# Patient Record
Sex: MALE | Race: BLACK OR AFRICAN AMERICAN | NOT HISPANIC OR LATINO | URBAN - METROPOLITAN AREA
[De-identification: names, ages, dates, MRNs, and addresses within clinical notes are randomized per-mention and may not be internally consistent; named-entity substitution may affect disease eponyms.]

---

## 2018-03-22 ENCOUNTER — EMERGENCY (EMERGENCY)
Facility: HOSPITAL | Age: 78
LOS: 0 days | Discharge: HOME | End: 2018-03-22
Attending: EMERGENCY MEDICINE
Payer: COMMERCIAL

## 2018-03-22 VITALS
OXYGEN SATURATION: 98 % | SYSTOLIC BLOOD PRESSURE: 133 MMHG | RESPIRATION RATE: 17 BRPM | TEMPERATURE: 98 F | DIASTOLIC BLOOD PRESSURE: 97 MMHG

## 2018-03-22 VITALS
RESPIRATION RATE: 18 BRPM | OXYGEN SATURATION: 98 % | HEART RATE: 80 BPM | WEIGHT: 251.99 LBS | TEMPERATURE: 98 F | DIASTOLIC BLOOD PRESSURE: 87 MMHG | SYSTOLIC BLOOD PRESSURE: 130 MMHG | HEIGHT: 76 IN

## 2018-03-22 DIAGNOSIS — E11.649 TYPE 2 DIABETES MELLITUS WITH HYPOGLYCEMIA WITHOUT COMA: ICD-10-CM

## 2018-03-22 DIAGNOSIS — I10 ESSENTIAL (PRIMARY) HYPERTENSION: ICD-10-CM

## 2018-03-22 DIAGNOSIS — R73.9 HYPERGLYCEMIA, UNSPECIFIED: ICD-10-CM

## 2018-03-22 LAB
GLUCOSE BLDC GLUCOMTR-MCNC: 227 MG/DL — HIGH (ref 70–99)
GLUCOSE BLDC GLUCOMTR-MCNC: 305 MG/DL — HIGH (ref 70–99)

## 2018-03-22 PROCEDURE — 99282 EMERGENCY DEPT VISIT SF MDM: CPT

## 2018-03-22 NOTE — ED ADULT NURSE NOTE - PMH
Afib    Arthritis    Arthritis    Atrial fibrillation    Ex-Cigarette Smoker    HTN (hypertension)    Hypertension    Osteoarthrosis, localized, primary, involving lower leg

## 2018-03-22 NOTE — ED PROVIDER NOTE - MEDICAL DECISION MAKING DETAILS
78 yo M with hypoglycemia 2/2 sulfonylurea  -sugar 200, will check again  -pt. understands that sulfonylureas can lower glucose to dangerous levels.  he agrees to stop taking this medicine until further instructed by PCP, pt. agrees to check his sugar tonight every few hours and eat well to avoid hypoglycemia, he understands that seizures can precipitate or even death if sugar goes too low.  all of this was discussed with patient and he verbalizes understanding at bedside

## 2018-03-22 NOTE — ED ADULT NURSE NOTE - PSH
H/O: knee surgery  2011  History of right  Inguinal Hernia Repair    Hx of tonsillectomy  as a child  Right Inguinal Hernia Repair ~ 2001

## 2018-03-22 NOTE — ED PROVIDER NOTE - OBJECTIVE STATEMENT
78 yo M with hypoglycemia at home.  Pt. measured his sugar at 68 a home and was told to come to the ED for eval.  Pt. says he feels fine, he felt fine before when he checked his sugar.  He notes that he takes glimepiride, last dose was 2 mg this morning.  Pt. has no complaints.  Finger stick sugar at triage was 200.    ROS: negative for fever, cough, headache, chest pain, shortness of breath, abd pain, nausea, vomiting, diarrhea, rash, paresthesia, and weakness.   PMH: NIDDM, HTN; Meds: losartan 100, carvedilol, Eliquis, folic acid, amlodipine, glimepiride, Lenny 40, MV; SH: Denies smoking/drinking/drug use

## 2019-05-07 ENCOUNTER — OUTPATIENT (OUTPATIENT)
Dept: OUTPATIENT SERVICES | Facility: HOSPITAL | Age: 79
LOS: 1 days | End: 2019-05-07
Payer: COMMERCIAL

## 2019-05-07 ENCOUNTER — APPOINTMENT (OUTPATIENT)
Dept: CV DIAGNOSITCS | Facility: HOSPITAL | Age: 79
End: 2019-05-07

## 2019-05-07 VITALS
SYSTOLIC BLOOD PRESSURE: 123 MMHG | DIASTOLIC BLOOD PRESSURE: 84 MMHG | WEIGHT: 251.99 LBS | OXYGEN SATURATION: 98 % | HEIGHT: 76 IN | RESPIRATION RATE: 16 BRPM | HEART RATE: 67 BPM

## 2019-05-07 DIAGNOSIS — Z01.818 ENCOUNTER FOR OTHER PREPROCEDURAL EXAMINATION: ICD-10-CM

## 2019-05-07 DIAGNOSIS — I48.91 UNSPECIFIED ATRIAL FIBRILLATION: ICD-10-CM

## 2019-05-07 LAB
ALBUMIN SERPL ELPH-MCNC: 4.2 G/DL — SIGNIFICANT CHANGE UP (ref 3.3–5)
ALP SERPL-CCNC: 81 U/L — SIGNIFICANT CHANGE UP (ref 40–120)
ALT FLD-CCNC: 13 U/L — SIGNIFICANT CHANGE UP (ref 10–45)
ANION GAP SERPL CALC-SCNC: 13 MMOL/L — SIGNIFICANT CHANGE UP (ref 5–17)
APTT BLD: 33 SEC — SIGNIFICANT CHANGE UP (ref 27.5–36.3)
AST SERPL-CCNC: 11 U/L — SIGNIFICANT CHANGE UP (ref 10–40)
BILIRUB SERPL-MCNC: 1 MG/DL — SIGNIFICANT CHANGE UP (ref 0.2–1.2)
BLD GP AB SCN SERPL QL: NEGATIVE — SIGNIFICANT CHANGE UP
BUN SERPL-MCNC: 18 MG/DL — SIGNIFICANT CHANGE UP (ref 7–23)
CALCIUM SERPL-MCNC: 10.1 MG/DL — SIGNIFICANT CHANGE UP (ref 8.4–10.5)
CHLORIDE SERPL-SCNC: 99 MMOL/L — SIGNIFICANT CHANGE UP (ref 96–108)
CO2 SERPL-SCNC: 27 MMOL/L — SIGNIFICANT CHANGE UP (ref 22–31)
CREAT SERPL-MCNC: 1.34 MG/DL — HIGH (ref 0.5–1.3)
GLUCOSE SERPL-MCNC: 117 MG/DL — HIGH (ref 70–99)
HCT VFR BLD CALC: 44.8 % — SIGNIFICANT CHANGE UP (ref 39–50)
HGB BLD-MCNC: 14.8 G/DL — SIGNIFICANT CHANGE UP (ref 13–17)
INR BLD: 1.29 RATIO — HIGH (ref 0.88–1.16)
MCHC RBC-ENTMCNC: 32.3 PG — SIGNIFICANT CHANGE UP (ref 27–34)
MCHC RBC-ENTMCNC: 33.1 GM/DL — SIGNIFICANT CHANGE UP (ref 32–36)
MCV RBC AUTO: 97.7 FL — SIGNIFICANT CHANGE UP (ref 80–100)
PLATELET # BLD AUTO: 204 K/UL — SIGNIFICANT CHANGE UP (ref 150–400)
POTASSIUM SERPL-MCNC: 3.5 MMOL/L — SIGNIFICANT CHANGE UP (ref 3.5–5.3)
POTASSIUM SERPL-SCNC: 3.5 MMOL/L — SIGNIFICANT CHANGE UP (ref 3.5–5.3)
PROT SERPL-MCNC: 7.7 G/DL — SIGNIFICANT CHANGE UP (ref 6–8.3)
PROTHROM AB SERPL-ACNC: 14.8 SEC — HIGH (ref 10–12.9)
RBC # BLD: 4.59 M/UL — SIGNIFICANT CHANGE UP (ref 4.2–5.8)
RBC # FLD: 11.9 % — SIGNIFICANT CHANGE UP (ref 10.3–14.5)
RH IG SCN BLD-IMP: POSITIVE — SIGNIFICANT CHANGE UP
SODIUM SERPL-SCNC: 139 MMOL/L — SIGNIFICANT CHANGE UP (ref 135–145)
WBC # BLD: 7.1 K/UL — SIGNIFICANT CHANGE UP (ref 3.8–10.5)
WBC # FLD AUTO: 7.1 K/UL — SIGNIFICANT CHANGE UP (ref 3.8–10.5)

## 2019-05-07 PROCEDURE — 86901 BLOOD TYPING SEROLOGIC RH(D): CPT

## 2019-05-07 PROCEDURE — G0463: CPT

## 2019-05-07 PROCEDURE — 93312 ECHO TRANSESOPHAGEAL: CPT

## 2019-05-07 PROCEDURE — 93312 ECHO TRANSESOPHAGEAL: CPT | Mod: 26

## 2019-05-07 PROCEDURE — 85610 PROTHROMBIN TIME: CPT

## 2019-05-07 PROCEDURE — 93306 TTE W/DOPPLER COMPLETE: CPT

## 2019-05-07 PROCEDURE — 86850 RBC ANTIBODY SCREEN: CPT

## 2019-05-07 PROCEDURE — 93010 ELECTROCARDIOGRAM REPORT: CPT

## 2019-05-07 PROCEDURE — 93306 TTE W/DOPPLER COMPLETE: CPT | Mod: 26

## 2019-05-07 PROCEDURE — 80053 COMPREHEN METABOLIC PANEL: CPT

## 2019-05-07 PROCEDURE — 93005 ELECTROCARDIOGRAM TRACING: CPT

## 2019-05-07 PROCEDURE — 85730 THROMBOPLASTIN TIME PARTIAL: CPT

## 2019-05-07 PROCEDURE — 86900 BLOOD TYPING SEROLOGIC ABO: CPT

## 2019-05-07 PROCEDURE — 85027 COMPLETE CBC AUTOMATED: CPT

## 2019-05-07 NOTE — H&P CARDIOLOGY - HISTORY OF PRESENT ILLNESS
78 y/o male with PMHx of OA, HTN, and Afib on Eliquis c/o "constant" fatigue r/t arrhythmia.  Patient is now for Afib ablation on 5/8/2019, he presents today for CANDIDA and preop evaluation.

## 2019-05-07 NOTE — H&P CARDIOLOGY - PMH
Afib    Ex-Cigarette Smoker    HTN (hypertension)    Osteoarthrosis, localized, primary, involving lower leg

## 2019-05-08 ENCOUNTER — OUTPATIENT (OUTPATIENT)
Dept: INPATIENT UNIT | Facility: HOSPITAL | Age: 79
LOS: 1 days | End: 2019-05-08
Payer: COMMERCIAL

## 2019-05-08 ENCOUNTER — TRANSCRIPTION ENCOUNTER (OUTPATIENT)
Age: 79
End: 2019-05-08

## 2019-05-08 VITALS
HEIGHT: 76 IN | TEMPERATURE: 98 F | SYSTOLIC BLOOD PRESSURE: 141 MMHG | WEIGHT: 244.05 LBS | RESPIRATION RATE: 27 BRPM | DIASTOLIC BLOOD PRESSURE: 95 MMHG | HEART RATE: 66 BPM | OXYGEN SATURATION: 94 %

## 2019-05-08 DIAGNOSIS — I48.91 UNSPECIFIED ATRIAL FIBRILLATION: ICD-10-CM

## 2019-05-08 LAB
ALBUMIN SERPL ELPH-MCNC: 3.6 G/DL — SIGNIFICANT CHANGE UP (ref 3.3–5)
ALP SERPL-CCNC: 74 U/L — SIGNIFICANT CHANGE UP (ref 40–120)
ALT FLD-CCNC: 12 U/L — SIGNIFICANT CHANGE UP (ref 10–45)
ANION GAP SERPL CALC-SCNC: 12 MMOL/L — SIGNIFICANT CHANGE UP (ref 5–17)
AST SERPL-CCNC: 23 U/L — SIGNIFICANT CHANGE UP (ref 10–40)
BILIRUB SERPL-MCNC: 0.7 MG/DL — SIGNIFICANT CHANGE UP (ref 0.2–1.2)
BLD GP AB SCN SERPL QL: NEGATIVE — SIGNIFICANT CHANGE UP
BUN SERPL-MCNC: 16 MG/DL — SIGNIFICANT CHANGE UP (ref 7–23)
CALCIUM SERPL-MCNC: 8.9 MG/DL — SIGNIFICANT CHANGE UP (ref 8.4–10.5)
CHLORIDE SERPL-SCNC: 101 MMOL/L — SIGNIFICANT CHANGE UP (ref 96–108)
CO2 SERPL-SCNC: 24 MMOL/L — SIGNIFICANT CHANGE UP (ref 22–31)
CREAT SERPL-MCNC: 1.24 MG/DL — SIGNIFICANT CHANGE UP (ref 0.5–1.3)
GLUCOSE SERPL-MCNC: 157 MG/DL — HIGH (ref 70–99)
POTASSIUM SERPL-MCNC: 3.2 MMOL/L — LOW (ref 3.5–5.3)
POTASSIUM SERPL-SCNC: 3.2 MMOL/L — LOW (ref 3.5–5.3)
PROT SERPL-MCNC: 6.9 G/DL — SIGNIFICANT CHANGE UP (ref 6–8.3)
RH IG SCN BLD-IMP: POSITIVE — SIGNIFICANT CHANGE UP
SODIUM SERPL-SCNC: 137 MMOL/L — SIGNIFICANT CHANGE UP (ref 135–145)

## 2019-05-08 RX ORDER — POTASSIUM CHLORIDE 20 MEQ
40 PACKET (EA) ORAL EVERY 4 HOURS
Refills: 0 | Status: COMPLETED | OUTPATIENT
Start: 2019-05-08 | End: 2019-05-09

## 2019-05-08 RX ORDER — BENZOCAINE AND MENTHOL 5; 1 G/100ML; G/100ML
1 LIQUID ORAL EVERY 6 HOURS
Refills: 0 | Status: DISCONTINUED | OUTPATIENT
Start: 2019-05-08 | End: 2019-05-23

## 2019-05-08 RX ORDER — CARVEDILOL PHOSPHATE 80 MG/1
3.12 CAPSULE, EXTENDED RELEASE ORAL EVERY 12 HOURS
Refills: 0 | Status: DISCONTINUED | OUTPATIENT
Start: 2019-05-08 | End: 2019-05-23

## 2019-05-08 RX ORDER — RIVAROXABAN 15 MG-20MG
20 KIT ORAL EVERY 24 HOURS
Refills: 0 | Status: DISCONTINUED | OUTPATIENT
Start: 2019-05-08 | End: 2019-05-08

## 2019-05-08 RX ORDER — CHLORHEXIDINE GLUCONATE 213 G/1000ML
1 SOLUTION TOPICAL DAILY
Refills: 0 | Status: DISCONTINUED | OUTPATIENT
Start: 2019-05-08 | End: 2019-05-23

## 2019-05-08 RX ORDER — PANTOPRAZOLE SODIUM 20 MG/1
40 TABLET, DELAYED RELEASE ORAL
Refills: 0 | Status: DISCONTINUED | OUTPATIENT
Start: 2019-05-08 | End: 2019-05-23

## 2019-05-08 RX ORDER — APIXABAN 2.5 MG/1
2.5 TABLET, FILM COATED ORAL EVERY 12 HOURS
Refills: 0 | Status: DISCONTINUED | OUTPATIENT
Start: 2019-05-08 | End: 2019-05-08

## 2019-05-08 RX ORDER — LOSARTAN POTASSIUM 100 MG/1
100 TABLET, FILM COATED ORAL DAILY
Refills: 0 | Status: DISCONTINUED | OUTPATIENT
Start: 2019-05-08 | End: 2019-05-23

## 2019-05-08 RX ORDER — CHLORHEXIDINE GLUCONATE 213 G/1000ML
1 SOLUTION TOPICAL
Refills: 0 | Status: DISCONTINUED | OUTPATIENT
Start: 2019-05-08 | End: 2019-05-23

## 2019-05-08 RX ORDER — AMLODIPINE BESYLATE 2.5 MG/1
5 TABLET ORAL DAILY
Refills: 0 | Status: DISCONTINUED | OUTPATIENT
Start: 2019-05-08 | End: 2019-05-23

## 2019-05-08 RX ORDER — APIXABAN 2.5 MG/1
5 TABLET, FILM COATED ORAL EVERY 12 HOURS
Refills: 0 | Status: DISCONTINUED | OUTPATIENT
Start: 2019-05-08 | End: 2019-05-23

## 2019-05-08 RX ORDER — PANTOPRAZOLE SODIUM 20 MG/1
40 TABLET, DELAYED RELEASE ORAL
Refills: 0 | Status: DISCONTINUED | OUTPATIENT
Start: 2019-05-08 | End: 2019-05-08

## 2019-05-08 RX ADMIN — BENZOCAINE AND MENTHOL 1 LOZENGE: 5; 1 LIQUID ORAL at 17:32

## 2019-05-08 RX ADMIN — CARVEDILOL PHOSPHATE 3.12 MILLIGRAM(S): 80 CAPSULE, EXTENDED RELEASE ORAL at 17:32

## 2019-05-08 RX ADMIN — APIXABAN 5 MILLIGRAM(S): 2.5 TABLET, FILM COATED ORAL at 17:33

## 2019-05-08 RX ADMIN — Medication 40 MILLIEQUIVALENT(S): at 22:55

## 2019-05-08 NOTE — CONSULT NOTE ADULT - SUBJECTIVE AND OBJECTIVE BOX
Requesting Physician : Dr. Barclay    Reason for Consultation: Dyspnea, HTN    HISTORY OF PRESENT ILLNESS:  78 yo M with history of HTN, Afib, gout who was admitted post Afib ablation.  The patient has been experiencing VÁSQUEZ and sob over the past year.  He was evaluated by Dr. Barclay for symptomatic Afib and was referred for Afib ablation.  The patient underwent afib ablation with no complications.  Upon evaluation, the patient denied chest pain, sob, palpitations, or any other cardiac complaints.         PAST MEDICAL & SURGICAL HISTORY:  Osteoarthrosis, localized, primary, involving lower leg  HTN (hypertension)  Afib  Ex-Cigarette Smoker  H/O: knee surgery: 2011  Hx of tonsillectomy: as a child  History of right  Inguinal Hernia Repair  Right Inguinal Hernia Repair ~ 2001          MEDICATIONS:  MEDICATIONS  (STANDING):  amLODIPine   Tablet 5 milliGRAM(s) Oral daily  apixaban 5 milliGRAM(s) Oral every 12 hours  carvedilol 3.125 milliGRAM(s) Oral every 12 hours  chlorhexidine 2% Cloths 1 Application(s) Topical daily  chlorhexidine 4% Liquid 1 Application(s) Topical <User Schedule>  losartan 100 milliGRAM(s) Oral daily  pantoprazole    Tablet 40 milliGRAM(s) Oral before breakfast      Allergies    No Known Allergies    Intolerances        FAMILY HISTORY:  FH: HTN (hypertension)    Non-contributary for premature coronary disease or sudden cardiac death    SOCIAL HISTORY:    [x ] Non-smoker  [ ] Smoker  [ ] Alcohol      REVIEW OF SYSTEMS:  [ ]chest pain  [ x ]shortness of breath  [  ]palpitations  [  ]syncope  [ ]near syncope [ ]upper extremity weakness   [ ] lower extremity weakness  [  ]diplopia  [  ]altered mental status   [  ]fevers  [ ]chills [ ]nausea  [ ]vomitting  [  ]dysphagia    [ ]abdominal pain  [ ]melena  [ ]BRBPR    [  ]epistaxis  [  ]rash    [ ]lower extremity edema        [x ] All others negative	  [ ] Unable to obtain    PHYSICAL EXAM:  T(C): 37.1 (05-08-19 @ 16:30), Max: 37.1 (05-08-19 @ 16:30)  HR: 74 (05-08-19 @ 18:07) (64 - 74)  BP: 117/86 (05-08-19 @ 18:07) (117/86 - 165/97)  RR: 20 (05-08-19 @ 18:07) (18 - 23)  SpO2: 97% (05-08-19 @ 18:07) (94% - 97%)  Wt(kg): --  I&O's Summary    08 May 2019 07:01  -  08 May 2019 19:27  --------------------------------------------------------  IN: 120 mL / OUT: 975 mL / NET: -855 mL          HEENT:   Normal oral mucosa, PERRL, EOMI	  Lymphatic: No lymphadenopathy , no edema  Cardiovascular: Normal S1 S2, No JVD, No murmurs , Peripheral pulses palpable 2+ bilaterally  Respiratory: Lungs clear to auscultation, normal effort 	  Gastrointestinal:  Soft, Non-tender, + BS	  Skin: No rashes, No ecchymoses, No cyanosis, warm to touch  Musculoskeletal: Normal range of motion, normal strength  Psychiatry:  Mood & affect appropriate      TELEMETRY: SR	    ECG:  < from: 12 Lead ECG (05.08.19 @ 07:02) >  Diagnosis Line ATRIAL FIBRILLATION    < end of copied text >  	  RADIOLOGY:  OTHER:     DIAGNOSTIC TESTING:  [ ] Echocardiogram: < from: Transesophageal Echocardiogram (05.07.19 @ 14:46) >  Conclusions:  1. Mildly dilated left atrium.  LA volume index = 36 cc/m2.    Spontaneous echo contrast seen.   No left atrial or left  atrial appendage thrombus.   Normal left atrial appendage  function.  2. Right atrial enlargement.  3. Right ventricular enlargement with normal right  ventricular systolic function.  4. Agitated saline injection and color flow Doppler  demonstrates no evidence of a patent foramen ovale.    < end of copied text >    [ ]  Catheterization:  [ ] Stress Test:    	  	  LABS:	 	    CARDIAC MARKERS:                              14.8   7.1   )-----------( 204      ( 07 May 2019 13:17 )             44.8     05-07    139  |  99  |  18  ----------------------------<  117<H>  3.5   |  27  |  1.34<H>    Ca    10.1      07 May 2019 13:17    TPro  7.7  /  Alb  4.2  /  TBili  1.0  /  DBili  x   /  AST  11  /  ALT  13  /  AlkPhos  81  05-07    proBNP:   Lipid Profile:   HgA1c:   TSH:     ASSESSMENT/PLAN:    78 yo M with history of HTN, Afib, gout who was admitted post Afib ablation.    -pt. tolerated procedure well   -continue with ccu monitoring  -no clinical heart failure  -continue with ac for cva prevention  -follow up ep    Sugey Mcconnell MD

## 2019-05-08 NOTE — H&P ADULT - NSICDXPASTSURGICALHX_GEN_ALL_CORE_FT
PAST SURGICAL HISTORY:  H/O: knee surgery 2011    History of right  Inguinal Hernia Repair     Hx of tonsillectomy as a child    Right Inguinal Hernia Repair ~ 2001

## 2019-05-08 NOTE — H&P ADULT - NSHPPHYSICALEXAM_GEN_ALL_CORE
Vital Signs Last 24 Hrs  T(C): --  T(F): --  HR: 66 (08 May 2019 14:15) (66 - 66)  BP: 126/86 (08 May 2019 14:15) (126/86 - 141/95)  BP(mean): 100 (08 May 2019 14:15) (100 - 109)  RR: 23 (08 May 2019 14:15) (23 - 27)  SpO2: 96% (08 May 2019 14:15) (94% - 96%)  CAPILLARY BLOOD GLUCOSE        I&O's Summary    08 May 2019 07:01  -  08 May 2019 14:50  --------------------------------------------------------  IN: 0 mL / OUT: 200 mL / NET: -200 mL        PHYSICAL EXAM:  GENERAL: NAD, well-developed  HEAD:  Atraumatic, Normocephalic  EYES: Arcus, senalis, EOMI, PERRLA  NECK: Supple, No JVD  CHEST/LUNG: Clear to auscultation bilaterally; No wheeze  HEART: Regular rate and rhythm; No murmurs, rubs, or gallops  ABDOMEN: Soft, Nontender, Nondistended; Bowel sounds present  EXTREMITIES:  sutures in places bilateral groin, 2+ Peripheral Pulses, No clubbing, cyanosis, or edema  PSYCH: AAOx3  NEUROLOGY: non-focal  SKIN: No rashes or lesions

## 2019-05-08 NOTE — H&P ADULT - HISTORY OF PRESENT ILLNESS
80 yo M pmhx of HTN, afib, ?gout presenting for afib ablation. Patient states he has had worsening VÁSQUEZ over the past year now with 2 flight VÁSQUEZ and SOB after 4-5 blocks. He states that he has also felt weak and tired without palpitations, chest pain, N/V, diaphoresis, syncope. Patient admitted to CCU for monitoring after afib ablation. Patient states that he feels well has a sore throat. Otherwise denies fever, chills, chest pain, palpitations, SOB, abdominal pain, N/V, diarrhea, dysuria.

## 2019-05-08 NOTE — H&P ADULT - NSICDXPASTMEDICALHX_GEN_ALL_CORE_FT
PAST MEDICAL HISTORY:  Afib     Ex-Cigarette Smoker     HTN (hypertension)     Osteoarthrosis, localized, primary, involving lower leg

## 2019-05-08 NOTE — H&P ADULT - ASSESSMENT
80 yo M pmhx of HTN, afib, ?gout presenting for monitoring s/p afib ablation    Afib ablation  -monitor on tele  -remove sutures from groin at 6hrs  -continue home meds carvedilol  -eliquis to 5 bid  -protonix 40mg daily for 1 month  -F/U with Rachelle on Tuesday May 21 at 11:45am  -f/u wtih me on Thursday may 23 at 3pm  HTN:  -Continue home amlodipine losartan

## 2019-05-08 NOTE — H&P ADULT - NSHPREVIEWOFSYSTEMS_GEN_ALL_CORE
REVIEW OF SYSTEMS:  Constitutional: [ X] negative [ ] fevers [ ] chills [ ] weight loss [ ] weight gain  HEENT: [X ] negative [ ] dry eyes [ ] eye irritation [ ] postnasal drip [ ] nasal congestion  CV: [X] negative  [ ] chest pain [ ] orthopnea [ ] palpitations [ ] murmur  Resp: [ X] negative [ ] cough [ ] shortness of breath [ ] dyspnea [ ] wheezing [ ] sputum [ ] hemoptysis  GI: [X ] negative [ ] nausea [ ] vomiting [ ] diarrhea [ ] constipation [ ] abd pain [ ] dysphagia   : [X ] negative [ ] dysuria [ ] nocturia [ ] hematuria [ ] increased urinary frequency  Musculoskeletal: [X ] negative [ ] back pain [ ] myalgias [ ] arthralgias [ ] fracture  Skin: [ X] negative [ ] rash [ ] itch  Neurological: [X ] negative [ ] headache [ ] dizziness [ ] syncope [ ] weakness [ ] numbness  Psychiatric: [ X] negative [ ] anxiety [ ] depression  Endocrine: [ X] negative [ ] diabetes [ ] thyroid problem  Hematologic/Lymphatic: [X ] negative [ ] anemia [ ] bleeding problem  Allergic/Immunologic: [X ] negative [ ] itchy eyes [ ] nasal discharge [ ] hives [ ] angioedema  [ ] All other systems negative  [ ] Unable to assess ROS because ________

## 2019-05-08 NOTE — H&P ADULT - NSHPLABSRESULTS_GEN_ALL_CORE
LABS:                        14.8   7.1   )-----------( 204      ( 07 May 2019 13:17 )             44.8     05-07    139  |  99  |  18  ----------------------------<  117<H>  3.5   |  27  |  1.34<H>    Ca    10.1      07 May 2019 13:17    TPro  7.7  /  Alb  4.2  /  TBili  1.0  /  DBili  x   /  AST  11  /  ALT  13  /  AlkPhos  81  05-07    PT/INR - ( 07 May 2019 13:17 )   PT: 14.8 sec;   INR: 1.29 ratio         PTT - ( 07 May 2019 13:17 )  PTT:33.0 sec    < from: Transesophageal Echocardiogram (05.07.19 @ 14:46) >    1. Mildly dilated left atrium.  LA volume index = 36 cc/m2.    Spontaneous echo contrast seen.   No left atrial or left  atrial appendage thrombus.   Normal left atrial appendage  function.  2. Right atrial enlargement.  3. Right ventricular enlargement with normal right  ventricular systolic function.  4. Agitated saline injection and color flow Doppler  demonstrates no evidence of a patent foramen ovale.    < end of copied text >

## 2019-05-08 NOTE — PROGRESS NOTE ADULT - SUBJECTIVE AND OBJECTIVE BOX
EP Brief Operative Note    Diagnosis: persistent AF  Procedure: persistent AF ablation and AFL ablation  Surgeon: Theodore Barclay M.D.  Findings: none  EBL: minimal  Specimens: none  Post-op Diagnosis: NSR  Assistants: none      A/P) s/p atrial fibrillation (PVI) and atrial flutter (CTI) ablations, no acute complications    -resume all home meds  -no carafate, no AAD  -expect d/c home in AM without changes to any home meds  -[facility] on Tuesday May 21 at 11:45am  -[facility]      Theodore Barclay M.D., [facility]  Cardiac Electrophysiology  [facility]  [address] [place] [address]  [email or web address] ([email or web address])    office [phone]  pager [phone] EP Brief Operative Note    Diagnosis: persistent AF  Procedure: persistent AF ablation and AFL ablation  Surgeon: Theodore Barclay M.D.  Findings: none  EBL: minimal  Specimens: none  Post-op Diagnosis: NSR  Assistants: none      A/P) s/p atrial fibrillation (PVI) and atrial flutter (CTI) ablations, no acute complications    -resume all home meds, but increase eliquis to 5 bid  -protonix 40mg daily for 1 month  -no carafate, no AAD  -expect d/c home in AM   -F/U with [facility] on Tuesday May 21 at 11:45am  -f/u wtih me on Thursday may 23 at 3pm      Theodore Barclay M.D., [facility]  Cardiac Electrophysiology  [facility]  [address] [place] [address]  [email or web address]    office [phone]  pager [phone]

## 2019-05-08 NOTE — CHART NOTE - NSCHARTNOTEFT_GEN_A_CORE
====================  CCU MIDNIGHT ROUNDS  ====================    ANICETO ARIAS  [id number]    ====================  SUMMARY: HPI:  78 yo M pmhx of HTN, afib, ?gout presenting for afib ablation. Patient states he has had worsening VÁSQUEZ over the past year now with 2 flight VÁSQUEZ and SOB after 4-5 blocks. He states that he has also felt weak and tired without palpitations, chest pain, N/V, diaphoresis, syncope. Patient admitted to CCU for monitoring after afib ablation. Patient states that he feels well has a sore throat. Otherwise denies fever, chills, chest pain, palpitations, SOB, abdominal pain, N/V, diarrhea, dysuria. (08 May 2019 14:43)    ====================        ====================  NEW EVENTS:  ====================        ====================  VITALS (Last 12 hrs):  ====================    T(C): 37.6 (05-08-19 @ 20:00), Max: 37.6 (05-08-19 @ 20:00)  HR: 68 (05-08-19 @ 22:00) (64 - 74)  BP: 108/73 (05-08-19 @ 22:00) (98/68 - 165/97)  BP(mean): 83 (05-08-19 @ 22:00) (81 - 131)  ABP: --  ABP(mean): --  RR: 27 (05-08-19 @ 22:00) (18 - 27)  SpO2: 96% (05-08-19 @ 22:00) (94% - 98%)  Wt(kg): --  CVP(mm Hg): --  CO: --  CI: --  PA: --  PA(mean): --  PA(direct): --  PCWP: --  SVR: --    TELEMETRY:        *BLOOD GAS/ARTERIAL/MIXED/VENOUS  *LACTATE    I&O's Summary    08 May 2019 07:01  -  08 May 2019 22:59  --------------------------------------------------------  IN: 120 mL / OUT: 1125 mL / NET: -1005 mL        ====================  PLAN:  ====================    HEALTH ISSUES - PROBLEM Dx:        HEALTH ISSUES - R/O PROBLEM Dx:      JYOTI Norman PA #[id number] ====================  CCU MIDNIGHT ROUNDS  ====================    ANICETO ARIAS  [id number]    ====================  SUMMARY: HPI:  78 yo M pmhx of HTN, afib, ?gout presenting for afib ablation. Patient states he has had worsening VÁSQUEZ over the past year now with 2 flight VÁSQUEZ and SOB after 4-5 blocks. He states that he has also felt weak and tired without palpitations, chest pain, N/V, diaphoresis, syncope. Patient admitted to CCU for monitoring after afib ablation. Patient states that he feels well has a sore throat. Otherwise denies fever, chills, chest pain, palpitations, SOB, abdominal pain, N/V, diarrhea, dysuria. (08 May 2019 14:43)    ====================        ====================  NEW EVENTS:  Bilateral groin sutures removed, potassium repleted.   ====================        ====================  VITALS (Last 12 hrs):  ====================    T(C): 37.6 (05-08-19 @ 20:00), Max: 37.6 (05-08-19 @ 20:00)  HR: 68 (05-08-19 @ 22:00) (64 - 74)  BP: 108/73 (05-08-19 @ 22:00) (98/68 - 165/97)  BP(mean): 83 (05-08-19 @ 22:00) (81 - 131)  RR: 27 (05-08-19 @ 22:00) (18 - 27)  SpO2: 96% (05-08-19 @ 22:00) (94% - 98%)      TELEMETRY: NSR 60's        I&O's Summary    08 May 2019 07:01  -  08 May 2019 22:59  --------------------------------------------------------  IN: 120 mL / OUT: 1125 mL / NET: -1005 mL        ====================  PLAN:  # A-Fib:   - s/p AFib ablation, now in NSR  - bilateral groin sutures removed, monitor groin sites   - xarelto tonight, PPI, soft diet  - c/w home meds  - likely d/c tomorrow if stable   ====================    HEALTH ISSUES - PROBLEM Dx:        Gabby Logan, CCU PA #[id number] ====================  CCU MIDNIGHT ROUNDS  ====================    ANICETO ARIAS  [id number]    ====================  SUMMARY: HPI:  78 yo M pmhx of HTN, afib, ?gout presenting for afib ablation. Patient states he has had worsening VÁSQUEZ over the past year now with 2 flight VÁSQUEZ and SOB after 4-5 blocks. He states that he has also felt weak and tired without palpitations, chest pain, N/V, diaphoresis, syncope. Patient admitted to CCU for monitoring after afib ablation. Patient states that he feels well has a sore throat. Otherwise denies fever, chills, chest pain, palpitations, SOB, abdominal pain, N/V, diarrhea, dysuria. (08 May 2019 14:43)    ====================        ====================  NEW EVENTS:  Bilateral groin sutures removed, potassium repleted.   ====================        ====================  VITALS (Last 12 hrs):  ====================    T(C): 37.6 (05-08-19 @ 20:00), Max: 37.6 (05-08-19 @ 20:00)  HR: 68 (05-08-19 @ 22:00) (64 - 74)  BP: 108/73 (05-08-19 @ 22:00) (98/68 - 165/97)  BP(mean): 83 (05-08-19 @ 22:00) (81 - 131)  RR: 27 (05-08-19 @ 22:00) (18 - 27)  SpO2: 96% (05-08-19 @ 22:00) (94% - 98%)      TELEMETRY: NSR 60's        I&O's Summary    08 May 2019 07:01  -  08 May 2019 22:59  --------------------------------------------------------  IN: 120 mL / OUT: 1125 mL / NET: -1005 mL        ====================  PLAN:  # A-Fib:   - s/p AFib ablation, now in NSR  - bilateral groin sutures removed, monitor groin sites   - eliqus tonight, PPI, soft diet  - c/w home meds  - likely d/c tomorrow if stable   ====================    HEALTH ISSUES - PROBLEM Dx:        Gabby Logan, CCU PA #[id number]

## 2019-05-09 ENCOUNTER — TRANSCRIPTION ENCOUNTER (OUTPATIENT)
Age: 79
End: 2019-05-09

## 2019-05-09 VITALS
SYSTOLIC BLOOD PRESSURE: 97 MMHG | HEART RATE: 76 BPM | DIASTOLIC BLOOD PRESSURE: 59 MMHG | RESPIRATION RATE: 27 BRPM | OXYGEN SATURATION: 95 %

## 2019-05-09 LAB
ALBUMIN SERPL ELPH-MCNC: 3.6 G/DL — SIGNIFICANT CHANGE UP (ref 3.3–5)
ALBUMIN SERPL ELPH-MCNC: 3.7 G/DL — SIGNIFICANT CHANGE UP (ref 3.3–5)
ALP SERPL-CCNC: 64 U/L — SIGNIFICANT CHANGE UP (ref 40–120)
ALP SERPL-CCNC: 75 U/L — SIGNIFICANT CHANGE UP (ref 40–120)
ALT FLD-CCNC: 12 U/L — SIGNIFICANT CHANGE UP (ref 10–45)
ALT FLD-CCNC: 17 U/L — SIGNIFICANT CHANGE UP (ref 10–45)
ANION GAP SERPL CALC-SCNC: 12 MMOL/L — SIGNIFICANT CHANGE UP (ref 5–17)
ANION GAP SERPL CALC-SCNC: 13 MMOL/L — SIGNIFICANT CHANGE UP (ref 5–17)
AST SERPL-CCNC: 26 U/L — SIGNIFICANT CHANGE UP (ref 10–40)
AST SERPL-CCNC: 62 U/L — HIGH (ref 10–40)
BASOPHILS # BLD AUTO: 0 K/UL — SIGNIFICANT CHANGE UP (ref 0–0.2)
BASOPHILS NFR BLD AUTO: 0.3 % — SIGNIFICANT CHANGE UP (ref 0–2)
BILIRUB SERPL-MCNC: 1.2 MG/DL — SIGNIFICANT CHANGE UP (ref 0.2–1.2)
BILIRUB SERPL-MCNC: 1.2 MG/DL — SIGNIFICANT CHANGE UP (ref 0.2–1.2)
BUN SERPL-MCNC: 14 MG/DL — SIGNIFICANT CHANGE UP (ref 7–23)
BUN SERPL-MCNC: 14 MG/DL — SIGNIFICANT CHANGE UP (ref 7–23)
CALCIUM SERPL-MCNC: 9.2 MG/DL — SIGNIFICANT CHANGE UP (ref 8.4–10.5)
CALCIUM SERPL-MCNC: 9.2 MG/DL — SIGNIFICANT CHANGE UP (ref 8.4–10.5)
CHLORIDE SERPL-SCNC: 100 MMOL/L — SIGNIFICANT CHANGE UP (ref 96–108)
CHLORIDE SERPL-SCNC: 99 MMOL/L — SIGNIFICANT CHANGE UP (ref 96–108)
CO2 SERPL-SCNC: 23 MMOL/L — SIGNIFICANT CHANGE UP (ref 22–31)
CO2 SERPL-SCNC: 23 MMOL/L — SIGNIFICANT CHANGE UP (ref 22–31)
CREAT SERPL-MCNC: 1.11 MG/DL — SIGNIFICANT CHANGE UP (ref 0.5–1.3)
CREAT SERPL-MCNC: 1.16 MG/DL — SIGNIFICANT CHANGE UP (ref 0.5–1.3)
EOSINOPHIL # BLD AUTO: 0 K/UL — SIGNIFICANT CHANGE UP (ref 0–0.5)
EOSINOPHIL NFR BLD AUTO: 0.2 % — SIGNIFICANT CHANGE UP (ref 0–6)
GLUCOSE SERPL-MCNC: 144 MG/DL — HIGH (ref 70–99)
GLUCOSE SERPL-MCNC: 151 MG/DL — HIGH (ref 70–99)
HCT VFR BLD CALC: 40.7 % — SIGNIFICANT CHANGE UP (ref 39–50)
HGB BLD-MCNC: 13.9 G/DL — SIGNIFICANT CHANGE UP (ref 13–17)
LYMPHOCYTES # BLD AUTO: 1 K/UL — SIGNIFICANT CHANGE UP (ref 1–3.3)
LYMPHOCYTES # BLD AUTO: 8.7 % — LOW (ref 13–44)
MAGNESIUM SERPL-MCNC: 1.7 MG/DL — SIGNIFICANT CHANGE UP (ref 1.6–2.6)
MCHC RBC-ENTMCNC: 33.1 PG — SIGNIFICANT CHANGE UP (ref 27–34)
MCHC RBC-ENTMCNC: 34.3 GM/DL — SIGNIFICANT CHANGE UP (ref 32–36)
MCV RBC AUTO: 96.6 FL — SIGNIFICANT CHANGE UP (ref 80–100)
MONOCYTES # BLD AUTO: 1 K/UL — HIGH (ref 0–0.9)
MONOCYTES NFR BLD AUTO: 8.9 % — SIGNIFICANT CHANGE UP (ref 2–14)
NEUTROPHILS # BLD AUTO: 9.3 K/UL — HIGH (ref 1.8–7.4)
NEUTROPHILS NFR BLD AUTO: 81.9 % — HIGH (ref 43–77)
PHOSPHATE SERPL-MCNC: 2.5 MG/DL — SIGNIFICANT CHANGE UP (ref 2.5–4.5)
PLATELET # BLD AUTO: 201 K/UL — SIGNIFICANT CHANGE UP (ref 150–400)
POTASSIUM SERPL-MCNC: 3.4 MMOL/L — LOW (ref 3.5–5.3)
POTASSIUM SERPL-MCNC: 6 MMOL/L — HIGH (ref 3.5–5.3)
POTASSIUM SERPL-SCNC: 3.4 MMOL/L — LOW (ref 3.5–5.3)
POTASSIUM SERPL-SCNC: 6 MMOL/L — HIGH (ref 3.5–5.3)
PROT SERPL-MCNC: 7.2 G/DL — SIGNIFICANT CHANGE UP (ref 6–8.3)
PROT SERPL-MCNC: 7.8 G/DL — SIGNIFICANT CHANGE UP (ref 6–8.3)
RBC # BLD: 4.21 M/UL — SIGNIFICANT CHANGE UP (ref 4.2–5.8)
RBC # FLD: 11.9 % — SIGNIFICANT CHANGE UP (ref 10.3–14.5)
SODIUM SERPL-SCNC: 134 MMOL/L — LOW (ref 135–145)
SODIUM SERPL-SCNC: 136 MMOL/L — SIGNIFICANT CHANGE UP (ref 135–145)
WBC # BLD: 11.4 K/UL — HIGH (ref 3.8–10.5)
WBC # FLD AUTO: 11.4 K/UL — HIGH (ref 3.8–10.5)

## 2019-05-09 PROCEDURE — 93005 ELECTROCARDIOGRAM TRACING: CPT

## 2019-05-09 PROCEDURE — 93010 ELECTROCARDIOGRAM REPORT: CPT

## 2019-05-09 PROCEDURE — 93623 PRGRMD STIMJ&PACG IV RX NFS: CPT

## 2019-05-09 PROCEDURE — 80053 COMPREHEN METABOLIC PANEL: CPT

## 2019-05-09 PROCEDURE — C1894: CPT

## 2019-05-09 PROCEDURE — 93613 INTRACARDIAC EPHYS 3D MAPG: CPT

## 2019-05-09 PROCEDURE — C1732: CPT

## 2019-05-09 PROCEDURE — 93657 TX L/R ATRIAL FIB ADDL: CPT

## 2019-05-09 PROCEDURE — C1730: CPT

## 2019-05-09 PROCEDURE — C1766: CPT

## 2019-05-09 PROCEDURE — 86901 BLOOD TYPING SEROLOGIC RH(D): CPT

## 2019-05-09 PROCEDURE — C1731: CPT

## 2019-05-09 PROCEDURE — 86900 BLOOD TYPING SEROLOGIC ABO: CPT

## 2019-05-09 PROCEDURE — 86850 RBC ANTIBODY SCREEN: CPT

## 2019-05-09 PROCEDURE — C1759: CPT

## 2019-05-09 PROCEDURE — 93656 COMPRE EP EVAL ABLTJ ATR FIB: CPT

## 2019-05-09 PROCEDURE — C1893: CPT

## 2019-05-09 RX ORDER — PANTOPRAZOLE SODIUM 20 MG/1
1 TABLET, DELAYED RELEASE ORAL
Qty: 30 | Refills: 0
Start: 2019-05-09 | End: 2019-06-07

## 2019-05-09 RX ORDER — APIXABAN 2.5 MG/1
1 TABLET, FILM COATED ORAL
Qty: 60 | Refills: 0
Start: 2019-05-09 | End: 2019-06-07

## 2019-05-09 RX ORDER — BENZOCAINE AND MENTHOL 5; 1 G/100ML; G/100ML
1 LIQUID ORAL
Qty: 12 | Refills: 0
Start: 2019-05-09 | End: 2019-05-12

## 2019-05-09 RX ORDER — MAGNESIUM SULFATE 500 MG/ML
2 VIAL (ML) INJECTION ONCE
Refills: 0 | Status: COMPLETED | OUTPATIENT
Start: 2019-05-09 | End: 2019-05-09

## 2019-05-09 RX ORDER — APIXABAN 2.5 MG/1
1 TABLET, FILM COATED ORAL
Qty: 0 | Refills: 0 | DISCHARGE

## 2019-05-09 RX ORDER — POTASSIUM CHLORIDE 20 MEQ
40 PACKET (EA) ORAL ONCE
Refills: 0 | Status: COMPLETED | OUTPATIENT
Start: 2019-05-09 | End: 2019-05-09

## 2019-05-09 RX ADMIN — CARVEDILOL PHOSPHATE 3.12 MILLIGRAM(S): 80 CAPSULE, EXTENDED RELEASE ORAL at 06:13

## 2019-05-09 RX ADMIN — LOSARTAN POTASSIUM 100 MILLIGRAM(S): 100 TABLET, FILM COATED ORAL at 05:29

## 2019-05-09 RX ADMIN — BENZOCAINE AND MENTHOL 1 LOZENGE: 5; 1 LIQUID ORAL at 00:53

## 2019-05-09 RX ADMIN — PANTOPRAZOLE SODIUM 40 MILLIGRAM(S): 20 TABLET, DELAYED RELEASE ORAL at 06:13

## 2019-05-09 RX ADMIN — Medication 40 MILLIEQUIVALENT(S): at 08:25

## 2019-05-09 RX ADMIN — AMLODIPINE BESYLATE 5 MILLIGRAM(S): 2.5 TABLET ORAL at 05:29

## 2019-05-09 RX ADMIN — Medication 50 GRAM(S): at 06:13

## 2019-05-09 RX ADMIN — CHLORHEXIDINE GLUCONATE 1 APPLICATION(S): 213 SOLUTION TOPICAL at 06:13

## 2019-05-09 RX ADMIN — Medication 40 MILLIEQUIVALENT(S): at 01:48

## 2019-05-09 RX ADMIN — APIXABAN 5 MILLIGRAM(S): 2.5 TABLET, FILM COATED ORAL at 05:29

## 2019-05-09 NOTE — DISCHARGE NOTE PROVIDER - NSDCFUADDINST_GEN_ALL_CORE_FT
No heavy lifting, strenuous activity, bending, straining or unnecessary stair climbing  for 2 weeks. No sex for 1 week.  No driving for 2 days. You may shower 24 hours following procedure but avoid baths and swimming for 1 week. Check groin site for bleeding and/or swelling daily following procedure. Call your doctor/cardiologist immediately should it occur or if you have increased/persistent pain at the site. Follow up with your cardiologist in 1- 2 weeks. You may call [facility] at [phone] or [phone] after office hours and weekends  with any questions or concerns following your procedure. Take medications as prescribed.

## 2019-05-09 NOTE — DISCHARGE NOTE PROVIDER - NSDCCPCAREPLAN_GEN_ALL_CORE_FT
PRINCIPAL DISCHARGE DIAGNOSIS  Diagnosis: Atrial fibrillation  Assessment and Plan of Treatment: Atrial fibrillation is the most common heart rhythm problem.  The condition puts you at risk for has stroke and heart attack. It helps if you control your blood pressure, not drink more than 1-2 alcohol drinks per day, cut down on caffeine, getting treatment for over active thyroid gland, and get regular exercise.  Call your doctor if you feel your heart racing or beating unusually, chest tightness or pain, lightheaded, faint, shortness of breath especially with exercise. It is important to take your heart medication as prescribed. You may be on anticoagulation which is very important to take as directed.

## 2019-05-09 NOTE — DISCHARGE NOTE NURSING/CASE MANAGEMENT/SOCIAL WORK - NSDCDPATPORTLINK_GEN_ALL_CORE
Left message for call back regarding ct scan and follow up with Dr Shea Ayala You can access the [facility] Portal, offered by [facility], by registering with the following website: [email or web address]

## 2019-05-09 NOTE — DISCHARGE NOTE PROVIDER - HOSPITAL COURSE
78 yo M with PMH HTn, A-Fib(on Eliquis) admitted for scheduled Afib ablation, s/p procedure 5/8 via bilateral groins. Patient tolerated procedure well and was monitored in CCU overnight. Eliquis increased to 5mg BID.

## 2019-05-09 NOTE — DISCHARGE NOTE PROVIDER - NSDCCPGOAL_GEN_ALL_CORE_FT
To get better and follow your care plan as instructed.  Your heart rate and rhythm will be controlled.

## 2019-05-09 NOTE — PROGRESS NOTE ADULT - SUBJECTIVE AND OBJECTIVE BOX
Subjective:  pt seen and examined, no complaints, ROS - .     amLODIPine   Tablet 5 milliGRAM(s) Oral daily  apixaban 5 milliGRAM(s) Oral every 12 hours  benzocaine 15 mG/menthol 3.6 mG Lozenge 1 Lozenge Oral every 6 hours PRN  carvedilol 3.125 milliGRAM(s) Oral every 12 hours  chlorhexidine 2% Cloths 1 Application(s) Topical daily  chlorhexidine 4% Liquid 1 Application(s) Topical <User Schedule>  losartan 100 milliGRAM(s) Oral daily  pantoprazole    Tablet 40 milliGRAM(s) Oral before breakfast                            13.9   11.4  )-----------( 201      ( 09 May 2019 04:42 )             40.7       Hemoglobin: 13.9 g/dL (05-09 @ 04:42)  Hemoglobin: 14.8 g/dL (05-07 @ 13:17)      05-09    136  |  100  |  14  ----------------------------<  151<H>  3.4<L>   |  23  |  1.16    Ca    9.2      09 May 2019 05:43  Phos  2.5     05-09  Mg     1.7     05-09    TPro  7.2  /  Alb  3.6  /  TBili  1.2  /  DBili  x   /  AST  26  /  ALT  12  /  AlkPhos  75  05-09    Creatinine Trend: 1.16<--, 1.11<--, 1.24<--, 1.34<--    COAGS:           T(C): 37.4 (05-09-19 @ 04:00), Max: 37.6 (05-08-19 @ 20:00)  HR: 78 (05-09-19 @ 06:00) (64 - 86)  BP: 110/69 (05-09-19 @ 06:00) (98/68 - 165/97)  RR: 27 (05-09-19 @ 06:00) (18 - 29)  SpO2: 95% (05-09-19 @ 06:00) (92% - 98%)  Wt(kg): --    I&O's Summary    08 May 2019 07:01  -  09 May 2019 07:00  --------------------------------------------------------  IN: 220 mL / OUT: 2025 mL / NET: -1805 mL        HEENT:   Normal oral mucosa, PERRL, EOMI	  Lymphatic: No lymphadenopathy , no edema  Cardiovascular: Normal S1 S2, No JVD, No murmurs , Peripheral pulses palpable 2+ bilaterally  Respiratory: Lungs clear to auscultation, normal effort 	  Gastrointestinal:  Soft, Non-tender, + BS	  Skin: No rashes, No ecchymoses, No cyanosis, warm to touch  Musculoskeletal: Normal range of motion, normal strength  Psychiatry:  Mood & affect appropriate      TELEMETRY: SR	    ECG:  < from: 12 Lead ECG (05.08.19 @ 07:02) >  Diagnosis Line ATRIAL FIBRILLATION    < end of copied text >  	  RADIOLOGY:  OTHER:     DIAGNOSTIC TESTING:  [ ] Echocardiogram: < from: Transesophageal Echocardiogram (05.07.19 @ 14:46) >  Conclusions:  1. Mildly dilated left atrium.  LA volume index = 36 cc/m2.    Spontaneous echo contrast seen.   No left atrial or left  atrial appendage thrombus.   Normal left atrial appendage  function.  2. Right atrial enlargement.  3. Right ventricular enlargement with normal right  ventricular systolic function.  4. Agitated saline injection and color flow Doppler  demonstrates no evidence of a patent foramen ovale.    < end of copied text >    [ ]  Catheterization:  [ ] Stress Test:        ASSESSMENT/PLAN:    78 yo M with history of HTN, Afib, gout who was admitted post Afib ablation.    - tele stable overnight   - cont eliquis for  cva prevention  - cont home dose of Coreg , Norvasc , Ace  -  GI / DVT prophylaxis,  keep K>4, mag >2.0   -no clinical heart failure  -follow up ep  D/W Dr Mcconnell

## 2019-05-09 NOTE — DISCHARGE NOTE PROVIDER - CARE PROVIDER_API CALL
Theodore Barclay)  Cardiac Electrophysiology; Cardiovascular Disease; Nuclear Cardiology  [address] [facility], [address]  [place] [address]  Phone: [phone]  Fax: [phone]  Follow Up Time:     Elizabeth Bush)  Interventional Cardiology  [address] [facility]  [place] [address]  Phone: [phone]  Fax: [phone]  Follow Up Time:

## 2019-05-09 NOTE — PROGRESS NOTE ADULT - ASSESSMENT
78 yo M pmhx of HTN, afib, ?gout presenting for monitoring s/p afib ablation    Afib ablation  -monitor on tele  -continue home meds carvedilol  -eliquis to 5 bid  -protonix 40mg daily for 1 month  -F/U with Rachelle on Tuesday May 21 at 11:45am  -f/u delilah swann on Thursday may 23 at 3pm  HTN:  -Continue home amlodipine losartan

## 2019-05-09 NOTE — PROGRESS NOTE ADULT - SUBJECTIVE AND OBJECTIVE BOX
PATIENT:  ANICETO ARIAS  [id number]    CHIEF COMPLAINT:  Patient is a 79y old  Male who presents with a chief complaint of Afib ablation (09 May 2019 00:10)      INTERVAL HISTORY/OVERNIGHT EVENTS:  No acute events overnight.   Mildly improved sore throat.      REVIEW OF SYSTEMS:    Constitutional:     [ ] negative [ ] fevers [ ] chills [ ] weight loss [ ] weight gain  HEENT:                  [ ] negative [ ] dry eyes [ ] eye irritation [ ] postnasal drip [ ] nasal congestion  CV:                         [ ] negative  [ ] chest pain [ ] orthopnea [ ] palpitations [ ] murmur  Resp:                     [ ] negative [ ] cough [ ] shortness of breath [ ] dyspnea [ ] wheezing [ ] sputum [ ] hemoptysis  GI:                          [ ] negative [ ] nausea [ ] vomiting [ ] diarrhea [ ] constipation [ ] abd pain [ ] dysphagia   :                        [ ] negative [ ] dysuria [ ] nocturia [ ] hematuria [ ] increased urinary frequency  Musculoskeletal: [ ] negative [ ] back pain [ ] myalgias [ ] arthralgias [ ] fracture  Skin:                       [ ] negative [ ] rash [ ] itch  Neurological:        [ ] negative [ ] headache [ ] dizziness [ ] syncope [ ] weakness [ ] numbness  Psychiatric:           [ ] negative [ ] anxiety [ ] depression  Endocrine:            [ ] negative [ ] diabetes [ ] thyroid problem  Heme/Lymph:      [ ] negative [ ] anemia [ ] bleeding problem  Allergic/Immune: [ ] negative [ ] itchy eyes [ ] nasal discharge [ ] hives [ ] angioedema    [X ] All other systems negative  [ ] Unable to assess ROS because ________.    MEDICATIONS:  MEDICATIONS  (STANDING):  amLODIPine   Tablet 5 milliGRAM(s) Oral daily  apixaban 5 milliGRAM(s) Oral every 12 hours  carvedilol 3.125 milliGRAM(s) Oral every 12 hours  chlorhexidine 2% Cloths 1 Application(s) Topical daily  chlorhexidine 4% Liquid 1 Application(s) Topical <User Schedule>  losartan 100 milliGRAM(s) Oral daily  pantoprazole    Tablet 40 milliGRAM(s) Oral before breakfast  potassium chloride    Tablet ER 40 milliEquivalent(s) Oral once    MEDICATIONS  (PRN):  benzocaine 15 mG/menthol 3.6 mG Lozenge 1 Lozenge Oral every 6 hours PRN Sore Throat      ALLERGIES:  Allergies    No Known Allergies    Intolerances        OBJECTIVE:  ICU Vital Signs Last 24 Hrs  T(C): 37.4 (09 May 2019 04:00), Max: 37.6 (08 May 2019 20:00)  T(F): 99.4 (09 May 2019 04:00), Max: 99.7 (08 May 2019 20:00)  HR: 78 (09 May 2019 07:00) (64 - 86)  BP: 133/87 (09 May 2019 07:00) (98/68 - 165/97)  BP(mean): 99 (09 May 2019 07:00) (81 - 131)  ABP: --  ABP(mean): --  RR: 31 (09 May 2019 07:00) (18 - 31)  SpO2: 95% (09 May 2019 07:00) (92% - 98%)      Adult Advanced Hemodynamics Last 24 Hrs  CVP(mm Hg): --  CVP(cm H2O): --  CO: --  CI: --  PA: --  PA(mean): --  PCWP: --  SVR: --  SVRI: --  PVR: --  PVRI: --  CAPILLARY BLOOD GLUCOSE        CAPILLARY BLOOD GLUCOSE        I&O's Summary    08 May 2019 07:01  -  09 May 2019 07:00  --------------------------------------------------------  IN: 270 mL / OUT: 2025 mL / NET: -1755 mL      Daily Height in cm: 193.04 (08 May 2019 14:00)    Daily Weight in k.4 (09 May 2019 06:00)    PHYSICAL EXAMINATION:  	GENERAL: NAD, well-developed  	HEAD:  Atraumatic, Normocephalic  	EYES: Arcus, senalis, EOMI, PERRLA  	NECK: Supple, No JVD  	CHEST/LUNG: Clear to auscultation bilaterally; No wheeze  	HEART: Regular rate and rhythm; No murmurs, rubs, or gallops  	ABDOMEN: Soft, Nontender, Nondistended; Bowel sounds present  	EXTREMITIES: 2+ Peripheral Pulses, No clubbing, cyanosis, or edema  	PSYCH: AAOx3  	NEUROLOGY: non-focal  SKIN: No rashes or lesions    LABS:                          13.9   11.4  )-----------( 201      ( 09 May 2019 04:42 )             40.7         136  |  100  |  14  ----------------------------<  151<H>  3.4<L>   |  23  |  1.16    Ca    9.2      09 May 2019 05:43  Phos  2.5       Mg     1.7         TPro  7.2  /  Alb  3.6  /  TBili  1.2  /  DBili  x   /  AST  26  /  ALT  12  /  AlkPhos  75      LIVER FUNCTIONS - ( 09 May 2019 05:43 )  Alb: 3.6 g/dL / Pro: 7.2 g/dL / ALK PHOS: 75 U/L / ALT: 12 U/L / AST: 26 U/L / GGT: x           PT/INR - ( 07 May 2019 13:17 )   PT: 14.8 sec;   INR: 1.29 ratio         PTT - ( 07 May 2019 13:17 )  PTT:33.0 sec

## 2019-08-28 ENCOUNTER — OUTPATIENT (OUTPATIENT)
Dept: OUTPATIENT SERVICES | Facility: HOSPITAL | Age: 79
LOS: 1 days | Discharge: HOME | End: 2019-08-28

## 2019-08-28 DIAGNOSIS — R79.1 ABNORMAL COAGULATION PROFILE: ICD-10-CM

## 2019-08-28 DIAGNOSIS — I50.43 ACUTE ON CHRONIC COMBINED SYSTOLIC (CONGESTIVE) AND DIASTOLIC (CONGESTIVE) HEART FAILURE: ICD-10-CM

## 2019-08-28 DIAGNOSIS — R06.2 WHEEZING: ICD-10-CM

## 2019-08-28 LAB
ANION GAP SERPL CALC-SCNC: 7 MMOL/L — SIGNIFICANT CHANGE UP (ref 5–17)
BUN SERPL-MCNC: 15 MG/DL — SIGNIFICANT CHANGE UP (ref 7–23)
CALCIUM SERPL-MCNC: 9 MG/DL — SIGNIFICANT CHANGE UP (ref 8.5–10.1)
CHLORIDE SERPL-SCNC: 103 MMOL/L — SIGNIFICANT CHANGE UP (ref 96–108)
CO2 SERPL-SCNC: 28 MMOL/L — SIGNIFICANT CHANGE UP (ref 22–31)
CREAT SERPL-MCNC: 1.36 MG/DL — HIGH (ref 0.5–1.3)
D DIMER BLD IA.RAPID-MCNC: <150 NG/ML DDU — SIGNIFICANT CHANGE UP
GLUCOSE SERPL-MCNC: 139 MG/DL — HIGH (ref 70–99)
POTASSIUM SERPL-MCNC: 3.2 MMOL/L — LOW (ref 3.5–5.3)
POTASSIUM SERPL-SCNC: 3.2 MMOL/L — LOW (ref 3.5–5.3)
SODIUM SERPL-SCNC: 138 MMOL/L — SIGNIFICANT CHANGE UP (ref 135–145)

## 2020-07-15 ENCOUNTER — APPOINTMENT (OUTPATIENT)
Dept: THORACIC SURGERY | Facility: CLINIC | Age: 80
End: 2020-07-15
Payer: MEDICARE

## 2020-07-15 VITALS
OXYGEN SATURATION: 98 % | RESPIRATION RATE: 16 BRPM | HEIGHT: 76 IN | SYSTOLIC BLOOD PRESSURE: 114 MMHG | BODY MASS INDEX: 30.08 KG/M2 | WEIGHT: 247 LBS | HEART RATE: 73 BPM | DIASTOLIC BLOOD PRESSURE: 80 MMHG

## 2020-07-15 DIAGNOSIS — Z87.891 PERSONAL HISTORY OF NICOTINE DEPENDENCE: ICD-10-CM

## 2020-07-15 DIAGNOSIS — Z86.79 PERSONAL HISTORY OF OTHER DISEASES OF THE CIRCULATORY SYSTEM: ICD-10-CM

## 2020-07-15 PROCEDURE — 99215 OFFICE O/P EST HI 40 MIN: CPT

## 2020-07-15 RX ORDER — AMLODIPINE BESYLATE 5 MG/1
5 TABLET ORAL
Refills: 0 | Status: ACTIVE | COMMUNITY

## 2020-07-15 RX ORDER — CARVEDILOL 3.12 MG/1
3.12 TABLET, FILM COATED ORAL
Refills: 0 | Status: ACTIVE | COMMUNITY

## 2020-07-15 RX ORDER — LOSARTAN POTASSIUM 100 MG/1
100 TABLET, FILM COATED ORAL
Refills: 0 | Status: ACTIVE | COMMUNITY

## 2020-07-15 RX ORDER — PANTOPRAZOLE SODIUM 40 MG/1
40 GRANULE, DELAYED RELEASE ORAL
Refills: 0 | Status: ACTIVE | COMMUNITY

## 2020-07-15 RX ORDER — INDAPAMIDE 1.25 MG
1.25 TABLET ORAL
Refills: 0 | Status: ACTIVE | COMMUNITY

## 2020-07-15 NOTE — PHYSICAL EXAM
[General Appearance - Alert] : alert [General Appearance - In No Acute Distress] : in no acute distress [General Appearance - Well Developed] : well developed [Extraocular Movements] : extraocular movements were intact [Hearing Threshold Finger Rub Not Sanborn] : hearing was normal [Sclera] : the sclera and conjunctiva were normal [Neck Appearance] : the appearance of the neck was normal [Examination Of The Oral Cavity] : the lips and gums were normal [Exaggerated Use Of Accessory Muscles For Inspiration] : no accessory muscle use [Respiration, Rhythm And Depth] : normal respiratory rhythm and effort [] : no respiratory distress [Diminished Respiratory Excursion] : normal chest expansion [Auscultation Breath Sounds / Voice Sounds] : lungs were clear to auscultation bilaterally [Bowel Sounds] : normal bowel sounds [Abdomen Soft] : soft [Cervical Lymph Nodes Enlarged Posterior Bilaterally] : posterior cervical [Supraclavicular Lymph Nodes Enlarged Bilaterally] : supraclavicular [Cervical Lymph Nodes Enlarged Anterior Bilaterally] : anterior cervical [Skin Color & Pigmentation] : normal skin color and pigmentation [Involuntary Movements] : no involuntary movements were seen [No Focal Deficits] : no focal deficits [Oriented To Time, Place, And Person] : oriented to person, place, and time [Impaired Insight] : insight and judgment were intact [Mood] : the mood was normal [Affect] : the affect was normal

## 2020-07-16 NOTE — HISTORY OF PRESENT ILLNESS
[FreeTextEntry1] : 80 year old male with history of HTN, vertigo, CKD, presents today for thoracic surgery consultation for ascending thoracic aortic aneurysm revealed on CT chest scan referred by PCP Dr. Alissa Goetz. As per patient, CT scan done for routine workup by PCP. \par \par CT chest scan 7/1/2020 reveals the aorta is ectatic and measures 4.9cm at level of pulmonary trunk bifurcation. No acute intrathoracic abnormality or active pulmonary disease is identified. \par \par Patient reports SOB with exertion for past few years. No increasing SOB and no SOB at rest. The patient denies chest pain, fever, chills, dysphagia or hemoptysis.

## 2020-07-16 NOTE — ASSESSMENT
[FreeTextEntry1] : 80 year old male with history of HTN, vertigo, CKD, presents today for thoracic surgery consultation for ascending thoracic aortic aneurysm revealed on CT chest scan referred by PCP Dr. Alissa Goetz. As per patient, CT scan done for routine workup by PCP. \par \par CT chest scan 7/1/2020 reveals the aorta is ectatic and measures 4.9cm at level of pulmonary trunk bifurcation. No acute intrathoracic abnormality or active pulmonary disease is identified. \par \par I have reviewed the medical records and images with the patient and made the following recommendations. I have recommended he complete follow up with CT chest scan with IV contrast in November. I have referred him to Dr. Star Yusuf for consult once CT scan is completed. He understands and will comply.\par \par Written by Carol Menjivar NP, acting as a scribe for Bj Trevino MD.\par The documentation recorded by the scribe accurately reflects the service I personally performed and the decisions made by me. BJ TREVINO MD\par

## 2020-11-18 ENCOUNTER — APPOINTMENT (OUTPATIENT)
Dept: THORACIC SURGERY | Facility: CLINIC | Age: 80
End: 2020-11-18
Payer: MEDICARE

## 2020-11-18 VITALS
WEIGHT: 247 LBS | DIASTOLIC BLOOD PRESSURE: 66 MMHG | HEART RATE: 77 BPM | OXYGEN SATURATION: 98 % | SYSTOLIC BLOOD PRESSURE: 115 MMHG | HEIGHT: 76 IN | BODY MASS INDEX: 30.08 KG/M2

## 2020-11-18 PROCEDURE — 99212 OFFICE O/P EST SF 10 MIN: CPT

## 2020-11-18 RX ORDER — POTASSIUM CHLORIDE 10 MEQ
10 CAPSULE, EXTENDED RELEASE ORAL
Refills: 0 | Status: COMPLETED | COMMUNITY
End: 2020-11-18

## 2020-11-18 RX ORDER — ALLOPURINOL 100 MG/1
100 TABLET ORAL
Refills: 0 | Status: COMPLETED | COMMUNITY
End: 2020-11-18

## 2020-11-18 RX ORDER — LEVOCETIRIZINE DIHYDROCHLORIDE 5 MG/1
5 TABLET, FILM COATED ORAL
Refills: 0 | Status: COMPLETED | COMMUNITY
End: 2020-11-18

## 2020-11-18 RX ORDER — IRON ASPGLY,PS/C/B12/FA/CA/SUC 150-25-1
CAPSULE ORAL
Refills: 0 | Status: COMPLETED | COMMUNITY
End: 2020-11-18

## 2020-11-18 RX ORDER — MECLIZINE HCL 50 MG/1
TABLET ORAL
Refills: 0 | Status: COMPLETED | COMMUNITY
End: 2020-11-18

## 2020-11-18 RX ORDER — FOLIC ACID 1 MG/1
1 TABLET ORAL
Refills: 0 | Status: COMPLETED | COMMUNITY
End: 2020-11-18

## 2020-11-20 NOTE — DATA REVIEWED
[FreeTextEntry1] : CT Chest w/ contrast on 11/2/2020:\par - 4.5 x 4.6cm tubular ascending aorta\par - 4.1 x 4.3cm proximal aortic arch

## 2020-11-20 NOTE — ASSESSMENT
[FreeTextEntry1] : 80 year old male with history of HTN, vertigo, CKD, presents today for thoracic surgery consultation for ascending thoracic aortic aneurysm revealed on CT chest scan referred by PCP Dr. Alissa Goetz. As per patient, CT scan done for routine workup by PCP. \par \par CT Chest w/ contrast reviewed, patient is again referred to CT Sx Dr. Star Yusuf.\par RTC as needed.\par \par \par I personally performed the services described in the documentation, reviewed the documentation recorded by the scribe in my presence and it accurately and completely records my words and actions.\par \par I, Gerry Dunn NP, am scribing for and the presence of BJ Correa, the following sections HISTORY OF PRESENT ILLNESS, PAST MEDICAL/FAMILY/SOCIAL HISTORY; REVIEW OF SYSTEMS; VITAL SIGNS; PHYSICAL EXAM; DISPOSITION.\par \par

## 2020-11-20 NOTE — HISTORY OF PRESENT ILLNESS
[FreeTextEntry1] : 80 year old male with history of HTN, vertigo, CKD, presents today for thoracic surgery consultation for ascending thoracic aortic aneurysm revealed on CT chest scan referred by PCP Dr. Alissa Goetz. As per patient, CT scan done for routine workup by PCP. \par \par CT chest scan 7/1/2020 reveals the aorta is ectatic and measures 4.9cm at level of pulmonary trunk bifurcation. No acute intrathoracic abnormality or active pulmonary disease is identified. \par \par CT Chest w/ contrast on 11/2/2020:\par - 4.5 x 4.6cm tubular ascending aorta\par - 4.1 x 4.3cm proximal aortic arch\par \par Patient is here today for a follow up. Denies SOB, CP, cough.

## 2020-11-20 NOTE — CONSULT LETTER
[Consult Letter:] : I had the pleasure of evaluating your patient, [unfilled]. [( Thank you for referring [unfilled] for consultation for _____ )] : Thank you for referring [unfilled] for consultation for [unfilled] [Please see my note below.] : Please see my note below. [Consult Closing:] : Thank you very much for allowing me to participate in the care of this patient.  If you have any questions, please do not hesitate to contact me. [Sincerely,] : Sincerely, [FreeTextEntry3] : Juan Smith MD \par VIce- Chair, Division of Thoracic Surgery \par  \par [facility]

## 2021-05-11 NOTE — H&P CARDIOLOGY - PSH
H/O: knee surgery  2011  History of right  Inguinal Hernia Repair    Hx of tonsillectomy  as a child  Right Inguinal Hernia Repair ~ 2001 no

## 2021-06-04 ENCOUNTER — OUTPATIENT (OUTPATIENT)
Dept: OUTPATIENT SERVICES | Facility: HOSPITAL | Age: 81
LOS: 1 days | Discharge: HOME | End: 2021-06-04
Payer: MEDICARE

## 2021-06-04 VITALS
TEMPERATURE: 98 F | HEIGHT: 76 IN | WEIGHT: 230.38 LBS | DIASTOLIC BLOOD PRESSURE: 80 MMHG | SYSTOLIC BLOOD PRESSURE: 147 MMHG | OXYGEN SATURATION: 100 % | HEART RATE: 80 BPM | RESPIRATION RATE: 16 BRPM

## 2021-06-04 DIAGNOSIS — K21.9 GASTRO-ESOPHAGEAL REFLUX DISEASE WITHOUT ESOPHAGITIS: ICD-10-CM

## 2021-06-04 DIAGNOSIS — Z01.818 ENCOUNTER FOR OTHER PREPROCEDURAL EXAMINATION: ICD-10-CM

## 2021-06-04 DIAGNOSIS — K40.30 UNILATERAL INGUINAL HERNIA, WITH OBSTRUCTION, WITHOUT GANGRENE, NOT SPECIFIED AS RECURRENT: ICD-10-CM

## 2021-06-04 DIAGNOSIS — I10 ESSENTIAL (PRIMARY) HYPERTENSION: ICD-10-CM

## 2021-06-04 DIAGNOSIS — E78.5 HYPERLIPIDEMIA, UNSPECIFIED: ICD-10-CM

## 2021-06-04 DIAGNOSIS — I48.91 UNSPECIFIED ATRIAL FIBRILLATION: ICD-10-CM

## 2021-06-04 LAB
ANION GAP SERPL CALC-SCNC: 7 MMOL/L — SIGNIFICANT CHANGE UP (ref 5–17)
APTT BLD: 32.9 SEC — SIGNIFICANT CHANGE UP (ref 27.5–35.5)
BASOPHILS # BLD AUTO: 0.09 K/UL — SIGNIFICANT CHANGE UP (ref 0–0.2)
BASOPHILS NFR BLD AUTO: 1.2 % — SIGNIFICANT CHANGE UP (ref 0–2)
BUN SERPL-MCNC: 19 MG/DL — SIGNIFICANT CHANGE UP (ref 7–23)
CALCIUM SERPL-MCNC: 9.3 MG/DL — SIGNIFICANT CHANGE UP (ref 8.5–10.1)
CHLORIDE SERPL-SCNC: 101 MMOL/L — SIGNIFICANT CHANGE UP (ref 96–108)
CO2 SERPL-SCNC: 29 MMOL/L — SIGNIFICANT CHANGE UP (ref 22–31)
CREAT SERPL-MCNC: 1.36 MG/DL — HIGH (ref 0.5–1.3)
EOSINOPHIL # BLD AUTO: 0.18 K/UL — SIGNIFICANT CHANGE UP (ref 0–0.5)
EOSINOPHIL NFR BLD AUTO: 2.3 % — SIGNIFICANT CHANGE UP (ref 0–6)
GLUCOSE SERPL-MCNC: 175 MG/DL — HIGH (ref 70–99)
HCT VFR BLD CALC: 30.6 % — LOW (ref 39–50)
HGB BLD-MCNC: 9.3 G/DL — LOW (ref 13–17)
IMM GRANULOCYTES NFR BLD AUTO: 0.4 % — SIGNIFICANT CHANGE UP (ref 0–1.5)
INR BLD: 1.47 RATIO — HIGH (ref 0.88–1.16)
LYMPHOCYTES # BLD AUTO: 1.7 K/UL — SIGNIFICANT CHANGE UP (ref 1–3.3)
LYMPHOCYTES # BLD AUTO: 22.1 % — SIGNIFICANT CHANGE UP (ref 13–44)
MCHC RBC-ENTMCNC: 25.3 PG — LOW (ref 27–34)
MCHC RBC-ENTMCNC: 30.4 GM/DL — LOW (ref 32–36)
MCV RBC AUTO: 83.4 FL — SIGNIFICANT CHANGE UP (ref 80–100)
MONOCYTES # BLD AUTO: 0.88 K/UL — SIGNIFICANT CHANGE UP (ref 0–0.9)
MONOCYTES NFR BLD AUTO: 11.4 % — SIGNIFICANT CHANGE UP (ref 2–14)
NEUTROPHILS # BLD AUTO: 4.82 K/UL — SIGNIFICANT CHANGE UP (ref 1.8–7.4)
NEUTROPHILS NFR BLD AUTO: 62.6 % — SIGNIFICANT CHANGE UP (ref 43–77)
NRBC # BLD: 0 /100 WBCS — SIGNIFICANT CHANGE UP (ref 0–0)
PLATELET # BLD AUTO: 283 K/UL — SIGNIFICANT CHANGE UP (ref 150–400)
POTASSIUM SERPL-MCNC: 3 MMOL/L — LOW (ref 3.5–5.3)
POTASSIUM SERPL-SCNC: 3 MMOL/L — LOW (ref 3.5–5.3)
PROTHROM AB SERPL-ACNC: 16.7 SEC — HIGH (ref 10.6–13.6)
RBC # BLD: 3.67 M/UL — LOW (ref 4.2–5.8)
RBC # FLD: 15 % — HIGH (ref 10.3–14.5)
SODIUM SERPL-SCNC: 137 MMOL/L — SIGNIFICANT CHANGE UP (ref 135–145)
WBC # BLD: 7.7 K/UL — SIGNIFICANT CHANGE UP (ref 3.8–10.5)
WBC # FLD AUTO: 7.7 K/UL — SIGNIFICANT CHANGE UP (ref 3.8–10.5)

## 2021-06-04 PROCEDURE — 93010 ELECTROCARDIOGRAM REPORT: CPT

## 2021-06-04 RX ORDER — FEBUXOSTAT 40 MG/1
1 TABLET ORAL
Qty: 0 | Refills: 0 | DISCHARGE

## 2021-06-04 NOTE — H&P PST ADULT - NSICDXPASTMEDICALHX_GEN_ALL_CORE_FT
PAST MEDICAL HISTORY:  Afib on eliquis    Ex-Cigarette Smoker     HTN (hypertension)     Hyperlipidemia     Inguinal hernia right    Osteoarthrosis, localized, primary, involving lower leg

## 2021-06-04 NOTE — H&P PST ADULT - HISTORY OF PRESENT ILLNESS
80yo male with medical h/o HTN, HDL, GERD and Afib on Eliquis. Pt c/o Right inguinal hernia and presents today for PST for Repair Incarcerated Right Inguinal Hernia with Mesh scheduled for 6/14/2021

## 2021-06-04 NOTE — H&P PST ADULT - NSICDXPROBLEM_GEN_ALL_CORE_FT
PROBLEM DIAGNOSES  Problem: Unilateral inguinal hernia, with obstruction, without gangrene, not specified as recurrent  Assessment and Plan: Pre-op instructions given. Pt verbalized understanding   Chlorhexidine wash instructions given  Pending: covid vaccine proff - pt states he ias fully vaccinated   Pending: M/C + Eliquis stop date/instructions     Problem: Afib  Assessment and Plan: Pt on Eliquis - pending stop date     Problem: Hypertension  Assessment and Plan: SAMANTHA precaution - stop bang 6  Pt instructed to take meds    Problem: Hyperlipidemia  Assessment and Plan: Pt instructed to take meds    Problem: GERD (gastroesophageal reflux disease)  Assessment and Plan: Pt instructed to take meds

## 2021-06-11 ENCOUNTER — APPOINTMENT (OUTPATIENT)
Dept: DISASTER EMERGENCY | Facility: CLINIC | Age: 81
End: 2021-06-11

## 2021-06-30 PROBLEM — E78.5 HYPERLIPIDEMIA, UNSPECIFIED: Chronic | Status: ACTIVE | Noted: 2021-06-04

## 2021-06-30 PROBLEM — K40.90 UNILATERAL INGUINAL HERNIA, WITHOUT OBSTRUCTION OR GANGRENE, NOT SPECIFIED AS RECURRENT: Chronic | Status: ACTIVE | Noted: 2021-06-04

## 2021-07-01 ENCOUNTER — APPOINTMENT (OUTPATIENT)
Dept: CV DIAGNOSTICS | Facility: HOSPITAL | Age: 81
End: 2021-07-01

## 2021-07-01 ENCOUNTER — APPOINTMENT (OUTPATIENT)
Dept: CV DIAGNOSITCS | Facility: HOSPITAL | Age: 81
End: 2021-07-01

## 2021-07-01 ENCOUNTER — OUTPATIENT (OUTPATIENT)
Dept: OUTPATIENT SERVICES | Facility: HOSPITAL | Age: 81
LOS: 1 days | End: 2021-07-01
Payer: COMMERCIAL

## 2021-07-01 DIAGNOSIS — I25.10 ATHEROSCLEROTIC HEART DISEASE OF NATIVE CORONARY ARTERY WITHOUT ANGINA PECTORIS: ICD-10-CM

## 2021-07-01 PROCEDURE — 93306 TTE W/DOPPLER COMPLETE: CPT | Mod: 26

## 2021-07-01 PROCEDURE — C8929: CPT

## 2021-07-07 ENCOUNTER — APPOINTMENT (OUTPATIENT)
Dept: CV DIAGNOSTICS | Facility: HOSPITAL | Age: 81
End: 2021-07-07

## 2021-07-07 ENCOUNTER — OUTPATIENT (OUTPATIENT)
Dept: OUTPATIENT SERVICES | Facility: HOSPITAL | Age: 81
LOS: 1 days | End: 2021-07-07
Payer: COMMERCIAL

## 2021-07-07 DIAGNOSIS — I25.10 ATHEROSCLEROTIC HEART DISEASE OF NATIVE CORONARY ARTERY WITHOUT ANGINA PECTORIS: ICD-10-CM

## 2021-07-07 PROCEDURE — 93017 CV STRESS TEST TRACING ONLY: CPT

## 2021-07-07 PROCEDURE — 78452 HT MUSCLE IMAGE SPECT MULT: CPT

## 2021-07-07 PROCEDURE — A9500: CPT

## 2021-07-07 PROCEDURE — 93018 CV STRESS TEST I&R ONLY: CPT

## 2021-07-07 PROCEDURE — 93016 CV STRESS TEST SUPVJ ONLY: CPT

## 2021-07-07 PROCEDURE — 78452 HT MUSCLE IMAGE SPECT MULT: CPT | Mod: 26

## 2021-07-26 ENCOUNTER — EMERGENCY (EMERGENCY)
Facility: HOSPITAL | Age: 81
LOS: 0 days | Discharge: HOME | End: 2021-07-26
Attending: EMERGENCY MEDICINE
Payer: MEDICARE

## 2021-07-26 ENCOUNTER — OUTPATIENT (OUTPATIENT)
Dept: OUTPATIENT SERVICES | Facility: HOSPITAL | Age: 81
LOS: 1 days | Discharge: HOME | End: 2021-07-26

## 2021-07-26 VITALS
WEIGHT: 239.86 LBS | OXYGEN SATURATION: 98 % | DIASTOLIC BLOOD PRESSURE: 70 MMHG | TEMPERATURE: 98 F | RESPIRATION RATE: 17 BRPM | HEART RATE: 75 BPM | HEIGHT: 76 IN | SYSTOLIC BLOOD PRESSURE: 102 MMHG

## 2021-07-26 VITALS
SYSTOLIC BLOOD PRESSURE: 115 MMHG | OXYGEN SATURATION: 98 % | RESPIRATION RATE: 21 BRPM | HEART RATE: 76 BPM | HEIGHT: 76 IN | DIASTOLIC BLOOD PRESSURE: 79 MMHG | TEMPERATURE: 98 F

## 2021-07-26 DIAGNOSIS — R79.9 ABNORMAL FINDING OF BLOOD CHEMISTRY, UNSPECIFIED: ICD-10-CM

## 2021-07-26 DIAGNOSIS — Z01.818 ENCOUNTER FOR OTHER PREPROCEDURAL EXAMINATION: ICD-10-CM

## 2021-07-26 DIAGNOSIS — I48.91 UNSPECIFIED ATRIAL FIBRILLATION: ICD-10-CM

## 2021-07-26 DIAGNOSIS — K40.90 UNILATERAL INGUINAL HERNIA, WITHOUT OBSTRUCTION OR GANGRENE, NOT SPECIFIED AS RECURRENT: ICD-10-CM

## 2021-07-26 DIAGNOSIS — D64.9 ANEMIA, UNSPECIFIED: ICD-10-CM

## 2021-07-26 DIAGNOSIS — I10 ESSENTIAL (PRIMARY) HYPERTENSION: ICD-10-CM

## 2021-07-26 DIAGNOSIS — E78.5 HYPERLIPIDEMIA, UNSPECIFIED: ICD-10-CM

## 2021-07-26 DIAGNOSIS — M19.90 UNSPECIFIED OSTEOARTHRITIS, UNSPECIFIED SITE: ICD-10-CM

## 2021-07-26 DIAGNOSIS — K40.30 UNILATERAL INGUINAL HERNIA, WITH OBSTRUCTION, WITHOUT GANGRENE, NOT SPECIFIED AS RECURRENT: ICD-10-CM

## 2021-07-26 LAB
A1C WITH ESTIMATED AVERAGE GLUCOSE RESULT: 6.8 % — HIGH (ref 4–5.6)
ANION GAP SERPL CALC-SCNC: 5 MMOL/L — SIGNIFICANT CHANGE UP (ref 5–17)
APTT BLD: 32.7 SEC — SIGNIFICANT CHANGE UP (ref 27.5–35.5)
BASOPHILS # BLD AUTO: 0.08 K/UL — SIGNIFICANT CHANGE UP (ref 0–0.2)
BASOPHILS NFR BLD AUTO: 1 % — SIGNIFICANT CHANGE UP (ref 0–2)
BUN SERPL-MCNC: 20 MG/DL — SIGNIFICANT CHANGE UP (ref 7–23)
CALCIUM SERPL-MCNC: 9.2 MG/DL — SIGNIFICANT CHANGE UP (ref 8.5–10.1)
CHLORIDE SERPL-SCNC: 103 MMOL/L — SIGNIFICANT CHANGE UP (ref 96–108)
CO2 SERPL-SCNC: 29 MMOL/L — SIGNIFICANT CHANGE UP (ref 22–31)
CREAT SERPL-MCNC: 1.68 MG/DL — HIGH (ref 0.5–1.3)
EOSINOPHIL # BLD AUTO: 0.17 K/UL — SIGNIFICANT CHANGE UP (ref 0–0.5)
EOSINOPHIL NFR BLD AUTO: 2.2 % — SIGNIFICANT CHANGE UP (ref 0–6)
ESTIMATED AVERAGE GLUCOSE: 148 MG/DL — HIGH (ref 68–114)
GLUCOSE SERPL-MCNC: 197 MG/DL — HIGH (ref 70–99)
HCT VFR BLD CALC: 29.6 % — LOW (ref 39–50)
HGB BLD-MCNC: 8.7 G/DL — LOW (ref 13–17)
IMM GRANULOCYTES NFR BLD AUTO: 0.4 % — SIGNIFICANT CHANGE UP (ref 0–1.5)
INR BLD: 1.68 RATIO — HIGH (ref 0.88–1.16)
LYMPHOCYTES # BLD AUTO: 1.43 K/UL — SIGNIFICANT CHANGE UP (ref 1–3.3)
LYMPHOCYTES # BLD AUTO: 18.2 % — SIGNIFICANT CHANGE UP (ref 13–44)
MCHC RBC-ENTMCNC: 23.8 PG — LOW (ref 27–34)
MCHC RBC-ENTMCNC: 29.4 GM/DL — LOW (ref 32–36)
MCV RBC AUTO: 81.1 FL — SIGNIFICANT CHANGE UP (ref 80–100)
MONOCYTES # BLD AUTO: 0.76 K/UL — SIGNIFICANT CHANGE UP (ref 0–0.9)
MONOCYTES NFR BLD AUTO: 9.7 % — SIGNIFICANT CHANGE UP (ref 2–14)
NEUTROPHILS # BLD AUTO: 5.4 K/UL — SIGNIFICANT CHANGE UP (ref 1.8–7.4)
NEUTROPHILS NFR BLD AUTO: 68.5 % — SIGNIFICANT CHANGE UP (ref 43–77)
NRBC # BLD: 0 /100 WBCS — SIGNIFICANT CHANGE UP (ref 0–0)
PLATELET # BLD AUTO: 302 K/UL — SIGNIFICANT CHANGE UP (ref 150–400)
POTASSIUM SERPL-MCNC: 3.4 MMOL/L — LOW (ref 3.5–5.3)
POTASSIUM SERPL-SCNC: 3.4 MMOL/L — LOW (ref 3.5–5.3)
PROTHROM AB SERPL-ACNC: 19 SEC — HIGH (ref 10.6–13.6)
RBC # BLD: 3.65 M/UL — LOW (ref 4.2–5.8)
RBC # FLD: 16.1 % — HIGH (ref 10.3–14.5)
SODIUM SERPL-SCNC: 137 MMOL/L — SIGNIFICANT CHANGE UP (ref 135–145)
WBC # BLD: 7.87 K/UL — SIGNIFICANT CHANGE UP (ref 3.8–10.5)
WBC # FLD AUTO: 7.87 K/UL — SIGNIFICANT CHANGE UP (ref 3.8–10.5)

## 2021-07-26 PROCEDURE — 99283 EMERGENCY DEPT VISIT LOW MDM: CPT

## 2021-07-26 NOTE — H&P PST ADULT - ASSESSMENT
Unilateral inguinal hernia, with obstruction, without gangrene, not specified as recurrent  right inguinal hernia  CAPRINI SCORE    AGE RELATED RISK FACTORS                                                       MOBILITY RELATED FACTORS  [ ] Age 41-60 years                                            (1 Point)                  [ ] Bed rest                                                        (1 Point)  [ ] Age: 61-74 years                                           (2 Points)                [ ] Plaster cast                                                   (2 Points)  [x ] Age= 75 years                                              (3 Points)                 [ ] Bed bound for more than 72 hours                   (2 Points)    DISEASE RELATED RISK FACTORS                                               GENDER SPECIFIC FACTORS  [ ] Edema in the lower extremities                       (1 Point)                  [ ] Pregnancy                                                     (1 Point)  [ ] Varicose veins                                               (1 Point)                  [ ] Post-partum < 6 weeks                                   (1 Point)             [ x] BMI > 25 Kg/m2                                            (1 Point)                  [ ] Hormonal therapy  or oral contraception            (1 Point)                 [ ] Sepsis (in the previous month)                        (1 Point)                  [ ] History of pregnancy complications  [ ] Pneumonia or serious lung disease                                               [ ] Unexplained or recurrent                       (1 Point)           (in the previous month)                               (1 Point)  [ ] Abnormal pulmonary function test                     (1 Point)                 SURGERY RELATED RISK FACTORS  [ ] Acute myocardial infarction                              (1 Point)                 [ ]  Section                                            (1 Point)  [ ] Congestive heart failure (in the previous month)  (1 Point)                 [ ] Minor surgery                                                 (1 Point)   [ ] Inflammatory bowel disease                             (1 Point)                 [ ] Arthroscopic surgery                                        (2 Points)  [ ] Central venous access                                    (2 Points)                [x ] General surgery lasting more than 45 minutes   (2 Points)       [ ] Stroke (in the previous month)                          (5 Points)               [ ] Elective arthroplasty                                        (5 Points)                                                                                                                                               HEMATOLOGY RELATED FACTORS                                                 TRAUMA RELATED RISK FACTORS  [ ] Prior episodes of VTE                                     (3 Points)                 [ ] Fracture of the hip, pelvis, or leg                       (5 Points)  [ ] Positive family history for VTE                         (3 Points)                 [ ] Acute spinal cord injury (in the previous month)  (5 Points)  [ ] Prothrombin 13373 A                                      (3 Points)                 [ ] Paralysis  (less than 1 month)                          (5 Points)  [ ] Factor V Leiden                                             (3 Points)                 [ ] Multiple Trauma within 1 month                         (5 Points)  [ ] Lupus anticoagulants                                     (3 Points)                                                           [ ] Anticardiolipin antibodies                                (3 Points)                                                       [ ] High homocysteine in the blood                      (3 Points)                                             [ ] Other congenital or acquired thrombophilia       (3 Points)                                                [ ] Heparin induced thrombocytopenia                  (3 Points)                                          Total Score [     6     ]  Caprini Score 0-2: Low risk, No VTE Prophylaxis required for most patient's, encourage ambulation  Caprini Score 3-6: At Risk, Pharmacologic VTE prophylaxis is indicated for most patients ( in the absence of a contraindication)  Caprini Score Greater than or = 7: High Risk , pharmacologic VTE is indicated for most patients ( in the absence of a contraindication)    Caprini score indicates that the patient is high risk for VTE event ( score 6 or greater). Surgical patient's in this group will benefit from both pharmacologic prophylaxis and intermittent compression devices . Surgical team will determine the balance between VTE  risk and bleeding risk and other clinical considerations

## 2021-07-26 NOTE — H&P PST ADULT - NSANTHOSAYNRD_GEN_A_CORE
No. SAMATNHA screening performed.  STOP BANG Legend: 0-2 = LOW Risk; 3-4 = INTERMEDIATE Risk; 5-8 = HIGH Risk

## 2021-07-26 NOTE — ED PROVIDER NOTE - NSFOLLOWUPINSTRUCTIONS_ED_ALL_ED_FT
1) Follow up with your primary care doctor.  2) Return to the ER for worsening or concerning symptoms

## 2021-07-26 NOTE — ED ADULT TRIAGE NOTE - CHIEF COMPLAINT QUOTE
states, " I went to PST here today for hernia repair and went home, and called to come to ED because my blood count is dangerously low" eliquis 5mg, coreg 3.125mg, indapamide 1.25mg, losartan 100mg, amlodipine 5mg, pantoprazole 40mg.

## 2021-07-26 NOTE — ED PROVIDER NOTE - PATIENT PORTAL LINK FT
You can access the FollowMyHealth Patient Portal offered by [facility] by registering at the following website: [email or web address]. By joining [employer]’s FollowMyHealth portal, you will also be able to view your health information using other applications (apps) compatible with our system.

## 2021-07-26 NOTE — ED ADULT NURSE NOTE - PMH
Afib  on eliquis  Ex-Cigarette Smoker    HTN (hypertension)    Hyperlipidemia    Inguinal hernia  right  Osteoarthrosis, localized, primary, involving lower leg

## 2021-07-26 NOTE — ED ADULT NURSE NOTE - OBJECTIVE STATEMENT
Pt is a 81 year old Male with PMHx of Inguinal Hernia, HLD, OA, HTN and Afib. Pt has a hernia surgery scheduled for 08/02. Patient was told to come to the ED.

## 2021-07-26 NOTE — ED PROVIDER NOTE - CLINICAL SUMMARY MEDICAL DECISION MAKING FREE TEXT BOX
Pt with reports of anemia but asymptomatic. As per EMR, Hg levels are 8.7, therefore not critical anemia and considering no symptoms, follow up with PMD and then coordinate care with surgery.

## 2021-07-26 NOTE — H&P PST ADULT - LAB RESULTS AND INTERPRETATION
labs done h/h 8.7/29.6  - advised to go to the ER h/h 8.7/29.6  - advised to go to the ER  spoke with Dr. Colón patient's cardiologist  discussed lab results low h/h since there is no evidence of patient actively bleeding , patient may stay on eliquis will follow up with PMD Dr. conner to evaluate his anemia

## 2021-07-26 NOTE — ED PROVIDER NOTE - OBJECTIVE STATEMENT
Pt is a 81 year old Male with PMHx of Inguinal Hernia, HLD, OA, HTN and Afib presenting to the ED for evaluation as per instructions of the nursing staff at [facility] today. Pt states he has a hernia surgery scheduled for 08/02, went to [facility] and had his blood drawn. Pt reports that when he got home about x30 minutes later, he was contacted to come to the ED as PST was unable to contact his PMD due to Hemoglobin being low.  Denies complaints of fatigue, weakness, SOB, palpitations, bloody stool or use of iron supplements. Pt reports his last colonoscopy x3 years ago was normal.

## 2021-07-26 NOTE — H&P PST ADULT - NSICDXPROBLEM_GEN_ALL_CORE_FT
PROBLEM DIAGNOSES  Problem: Unilateral inguinal hernia, with obstruction, without gangrene, not specified as recurrent  Assessment and Plan: Pre-op instructions given. Pt verbalized understanding   Chlorhexidine wash instructions given  Pending: covid vaccine proff - pt states he ias fully vaccinated   Pending: M/C + Eliquis stop date/instructions     Problem: Afib  Assessment and Plan: Pt on Eliquis - pending stop date     Problem: Hypertension  Assessment and Plan: SAMANTHA precaution - stop bang 6  Pt instructed to take meds    Problem: Hyperlipidemia  Assessment and Plan: Pt instructed to take meds    Problem: GERD (gastroesophageal reflux disease)  Assessment and Plan: Pt instructed to take meds       PROBLEM DIAGNOSES  Problem: Right groin hernia  Assessment and Plan: scheduled for right inguinal hernia repair    Problem: HTN (hypertension)  Assessment and Plan: continue meds      Problem: HLD (hyperlipidemia)  Assessment and Plan: continue meds      Problem: Afib  Assessment and Plan: hold eliquis 2 days as per cardio - informed anesthesia and surgeon    Problem: Preoperative examination  Assessment and Plan: Preop instructions provided including NPO status. Hibiclens wash for infection control. Patient aware to stop NSAID, OTC herbals  for 7-10 days, needs to be accoumpanied by adult upon discharge.  Patient verbalized understanding  anesthesiologist to review pst labs, ekg, medical clearances and optimization for surgery

## 2021-07-26 NOTE — H&P PST ADULT - HISTORY OF PRESENT ILLNESS
82yo male with medical h/o HTN, HDL, GERD and Afib on Eliquis. Pt c/o Right inguinal hernia and presents today for PST for Repair Incarcerated Right Inguinal Hernia with Mesh scheduled for 6/14/2021 80yo male with medical h/o HTN, HDL, GERD and Afib on Eliquis. Pt c/o Right inguinal hernia , scheduled for right inguinal hernia repair    denies recent travels in the past 30 days. No fever, SOB, cough, flu like symptoms or body rash- covid screen  completed covid19 vaccine

## 2021-08-01 ENCOUNTER — TRANSCRIPTION ENCOUNTER (OUTPATIENT)
Age: 81
End: 2021-08-01

## 2021-08-02 ENCOUNTER — OUTPATIENT (OUTPATIENT)
Dept: OUTPATIENT SERVICES | Facility: HOSPITAL | Age: 81
LOS: 1 days | Discharge: HOME | End: 2021-08-02

## 2021-08-02 VITALS
HEART RATE: 72 BPM | OXYGEN SATURATION: 98 % | DIASTOLIC BLOOD PRESSURE: 81 MMHG | RESPIRATION RATE: 18 BRPM | WEIGHT: 242.07 LBS | HEIGHT: 76 IN | SYSTOLIC BLOOD PRESSURE: 130 MMHG | TEMPERATURE: 98 F

## 2021-08-02 VITALS
RESPIRATION RATE: 16 BRPM | HEART RATE: 63 BPM | DIASTOLIC BLOOD PRESSURE: 81 MMHG | OXYGEN SATURATION: 96 % | SYSTOLIC BLOOD PRESSURE: 131 MMHG

## 2021-08-02 RX ORDER — KETOROLAC TROMETHAMINE 30 MG/ML
1 SYRINGE (ML) INJECTION
Qty: 12 | Refills: 0
Start: 2021-08-02 | End: 2021-08-04

## 2021-08-02 RX ORDER — SODIUM CHLORIDE 9 MG/ML
1000 INJECTION, SOLUTION INTRAVENOUS
Refills: 0 | Status: DISCONTINUED | OUTPATIENT
Start: 2021-08-02 | End: 2021-08-03

## 2021-08-02 RX ORDER — LOSARTAN POTASSIUM 100 MG/1
1 TABLET, FILM COATED ORAL
Qty: 0 | Refills: 0 | DISCHARGE

## 2021-08-02 RX ORDER — AMLODIPINE BESYLATE 2.5 MG/1
1 TABLET ORAL
Qty: 0 | Refills: 0 | DISCHARGE

## 2021-08-02 RX ORDER — CARVEDILOL PHOSPHATE 80 MG/1
1 CAPSULE, EXTENDED RELEASE ORAL
Qty: 0 | Refills: 0 | DISCHARGE

## 2021-08-02 RX ORDER — INDAPAMIDE 1.25 MG
1 TABLET ORAL
Qty: 0 | Refills: 0 | DISCHARGE

## 2021-08-02 RX ORDER — HYDROMORPHONE HYDROCHLORIDE 2 MG/ML
0.5 INJECTION INTRAMUSCULAR; INTRAVENOUS; SUBCUTANEOUS
Refills: 0 | Status: DISCONTINUED | OUTPATIENT
Start: 2021-08-02 | End: 2021-08-03

## 2021-08-02 RX ORDER — KETOROLAC TROMETHAMINE 30 MG/ML
10 SYRINGE (ML) INJECTION
Qty: 12 | Refills: 0
Start: 2021-08-02

## 2021-08-02 RX ADMIN — SODIUM CHLORIDE 75 MILLILITER(S): 9 INJECTION, SOLUTION INTRAVENOUS at 09:25

## 2021-08-02 NOTE — ASU PATIENT PROFILE, ADULT - PATIENT'S HEIGHT AND WEIGHT RECORDED IN THE VITAL SIGNS FLOWSHEET
At [facility], one important tool we use to improve our patient services is our Patient Survey.  Following your visit you may receive our survey in the mail.    Please take the time to complete the survey.    If your visit with us was great, we want to hear about it.    If we can improve, please let us know how.             Care of Cryotherapy Wounds    1. Clean treated site daily with soap and water. Bathing and showering are permitted.    2. Apply a small amount of petroleum jelly or antibiotic ointment daily on scab when it forms. The scab will fall off on its own. The wound does not need to be covered.    3. WARTS: If warts were treated, especially on the hands and feet, a blister may form. If the blister is painful, drain the fluid out of the blister by making a small hole with a needle cleaned with rubbing alcohol and pushing gently on the blister directly with your thumb. Take care not to remove the skin covering the blister. A Band-Aid may be used to cover the blister, as the site may drain fluid.    4. SIGNS OF INFECTION: If any of the following signs of infection appear, call the office:      A. Red, painful, swollen, and warm    B. Red streaks around wound    C. Yellow or green pus in wound    D. Chills or fever      BRENDA Kolb    
yes

## 2021-08-02 NOTE — ASU DISCHARGE PLAN (ADULT/PEDIATRIC) - CARE PROVIDER_API CALL
Erika Ferrari)  Surgery  [address], [address]  [place] [address]  Phone: [phone]  Fax: [phone]  Established Patient  Follow Up Time:

## 2021-08-02 NOTE — ASU PATIENT PROFILE, ADULT - BLOOD TRANSFUSION, PREVIOUS, PROFILE
December 28, 2020         Patient: Vin Doll   YOB: 2012   Date of Visit: 12/28/2020           To Whom it May Concern:    Vin Doll was seen in my clinic on 12/28/2020. He is experiencing some issues with constipation and fecal incontinence. We have discussed a plan including using miralax to titrate to a soft daily bowel movement plus toilet training. He will be following up with a gastroenterologist to assess this further.     If you have any questions or concerns, please don't hesitate to call.        Sincerely,           MANNY Lo.  Electronically Signed      no

## 2021-08-05 DIAGNOSIS — I48.91 UNSPECIFIED ATRIAL FIBRILLATION: ICD-10-CM

## 2021-08-05 DIAGNOSIS — E78.5 HYPERLIPIDEMIA, UNSPECIFIED: ICD-10-CM

## 2021-08-05 DIAGNOSIS — K40.90 UNILATERAL INGUINAL HERNIA, WITHOUT OBSTRUCTION OR GANGRENE, NOT SPECIFIED AS RECURRENT: ICD-10-CM

## 2021-08-05 DIAGNOSIS — I10 ESSENTIAL (PRIMARY) HYPERTENSION: ICD-10-CM

## 2021-08-05 DIAGNOSIS — Z87.891 PERSONAL HISTORY OF NICOTINE DEPENDENCE: ICD-10-CM

## 2021-08-05 DIAGNOSIS — Z79.01 LONG TERM (CURRENT) USE OF ANTICOAGULANTS: ICD-10-CM

## 2021-08-05 DIAGNOSIS — K21.9 GASTRO-ESOPHAGEAL REFLUX DISEASE WITHOUT ESOPHAGITIS: ICD-10-CM

## 2022-02-18 ENCOUNTER — EMERGENCY (EMERGENCY)
Facility: HOSPITAL | Age: 82
LOS: 0 days | Discharge: HOME | End: 2022-02-18
Attending: EMERGENCY MEDICINE
Payer: MEDICARE

## 2022-02-18 VITALS
TEMPERATURE: 98 F | RESPIRATION RATE: 14 BRPM | HEART RATE: 78 BPM | SYSTOLIC BLOOD PRESSURE: 118 MMHG | DIASTOLIC BLOOD PRESSURE: 79 MMHG | OXYGEN SATURATION: 99 %

## 2022-02-18 VITALS
DIASTOLIC BLOOD PRESSURE: 86 MMHG | SYSTOLIC BLOOD PRESSURE: 126 MMHG | RESPIRATION RATE: 19 BRPM | OXYGEN SATURATION: 98 % | TEMPERATURE: 98 F | HEIGHT: 76 IN | HEART RATE: 80 BPM | WEIGHT: 240.08 LBS

## 2022-02-18 DIAGNOSIS — E11.65 TYPE 2 DIABETES MELLITUS WITH HYPERGLYCEMIA: ICD-10-CM

## 2022-02-18 DIAGNOSIS — I48.91 UNSPECIFIED ATRIAL FIBRILLATION: ICD-10-CM

## 2022-02-18 DIAGNOSIS — F17.200 NICOTINE DEPENDENCE, UNSPECIFIED, UNCOMPLICATED: ICD-10-CM

## 2022-02-18 DIAGNOSIS — Z79.01 LONG TERM (CURRENT) USE OF ANTICOAGULANTS: ICD-10-CM

## 2022-02-18 DIAGNOSIS — E78.5 HYPERLIPIDEMIA, UNSPECIFIED: ICD-10-CM

## 2022-02-18 DIAGNOSIS — I10 ESSENTIAL (PRIMARY) HYPERTENSION: ICD-10-CM

## 2022-02-18 LAB
ALBUMIN SERPL ELPH-MCNC: 3.4 G/DL — SIGNIFICANT CHANGE UP (ref 3.3–5)
ALP SERPL-CCNC: 102 U/L — SIGNIFICANT CHANGE UP (ref 40–120)
ALT FLD-CCNC: 15 U/L — SIGNIFICANT CHANGE UP (ref 12–78)
ANION GAP SERPL CALC-SCNC: 5 MMOL/L — SIGNIFICANT CHANGE UP (ref 5–17)
AST SERPL-CCNC: 13 U/L — LOW (ref 15–37)
B-OH-BUTYR SERPL-SCNC: 0.1 MMOL/L — SIGNIFICANT CHANGE UP
BASOPHILS # BLD AUTO: 0.05 K/UL — SIGNIFICANT CHANGE UP (ref 0–0.2)
BASOPHILS NFR BLD AUTO: 0.7 % — SIGNIFICANT CHANGE UP (ref 0–2)
BILIRUB SERPL-MCNC: 0.6 MG/DL — SIGNIFICANT CHANGE UP (ref 0.2–1.2)
BUN SERPL-MCNC: 20 MG/DL — SIGNIFICANT CHANGE UP (ref 7–23)
CALCIUM SERPL-MCNC: 9.8 MG/DL — SIGNIFICANT CHANGE UP (ref 8.5–10.1)
CHLORIDE SERPL-SCNC: 99 MMOL/L — SIGNIFICANT CHANGE UP (ref 96–108)
CO2 SERPL-SCNC: 31 MMOL/L — SIGNIFICANT CHANGE UP (ref 22–31)
CREAT SERPL-MCNC: 1.47 MG/DL — HIGH (ref 0.5–1.3)
EOSINOPHIL # BLD AUTO: 0.13 K/UL — SIGNIFICANT CHANGE UP (ref 0–0.5)
EOSINOPHIL NFR BLD AUTO: 1.9 % — SIGNIFICANT CHANGE UP (ref 0–6)
GLUCOSE BLDC GLUCOMTR-MCNC: 249 MG/DL — HIGH (ref 70–99)
GLUCOSE SERPL-MCNC: 352 MG/DL — HIGH (ref 70–99)
HCT VFR BLD CALC: 37.5 % — LOW (ref 39–50)
HGB BLD-MCNC: 12 G/DL — LOW (ref 13–17)
IMM GRANULOCYTES NFR BLD AUTO: 0.3 % — SIGNIFICANT CHANGE UP (ref 0–1.5)
LYMPHOCYTES # BLD AUTO: 1.45 K/UL — SIGNIFICANT CHANGE UP (ref 1–3.3)
LYMPHOCYTES # BLD AUTO: 21.2 % — SIGNIFICANT CHANGE UP (ref 13–44)
MAGNESIUM SERPL-MCNC: 2.5 MG/DL — SIGNIFICANT CHANGE UP (ref 1.6–2.6)
MCHC RBC-ENTMCNC: 28.8 PG — SIGNIFICANT CHANGE UP (ref 27–34)
MCHC RBC-ENTMCNC: 32 G/DL — SIGNIFICANT CHANGE UP (ref 32–36)
MCV RBC AUTO: 90.1 FL — SIGNIFICANT CHANGE UP (ref 80–100)
MONOCYTES # BLD AUTO: 0.73 K/UL — SIGNIFICANT CHANGE UP (ref 0–0.9)
MONOCYTES NFR BLD AUTO: 10.7 % — SIGNIFICANT CHANGE UP (ref 2–14)
NEUTROPHILS # BLD AUTO: 4.47 K/UL — SIGNIFICANT CHANGE UP (ref 1.8–7.4)
NEUTROPHILS NFR BLD AUTO: 65.2 % — SIGNIFICANT CHANGE UP (ref 43–77)
NRBC # BLD: 0 /100 WBCS — SIGNIFICANT CHANGE UP (ref 0–0)
PLATELET # BLD AUTO: 237 K/UL — SIGNIFICANT CHANGE UP (ref 150–400)
POTASSIUM SERPL-MCNC: 3.5 MMOL/L — SIGNIFICANT CHANGE UP (ref 3.5–5.3)
POTASSIUM SERPL-SCNC: 3.5 MMOL/L — SIGNIFICANT CHANGE UP (ref 3.5–5.3)
PROT SERPL-MCNC: 8.5 GM/DL — HIGH (ref 6–8.3)
RBC # BLD: 4.16 M/UL — LOW (ref 4.2–5.8)
RBC # FLD: 12.6 % — SIGNIFICANT CHANGE UP (ref 10.3–14.5)
SODIUM SERPL-SCNC: 135 MMOL/L — SIGNIFICANT CHANGE UP (ref 135–145)
WBC # BLD: 6.85 K/UL — SIGNIFICANT CHANGE UP (ref 3.8–10.5)
WBC # FLD AUTO: 6.85 K/UL — SIGNIFICANT CHANGE UP (ref 3.8–10.5)

## 2022-02-18 PROCEDURE — 99284 EMERGENCY DEPT VISIT MOD MDM: CPT

## 2022-02-18 RX ORDER — METFORMIN HYDROCHLORIDE 850 MG/1
1 TABLET ORAL
Qty: 60 | Refills: 0
Start: 2022-02-18 | End: 2022-03-19

## 2022-02-18 RX ORDER — SODIUM CHLORIDE 9 MG/ML
2000 INJECTION, SOLUTION INTRAVENOUS ONCE
Refills: 0 | Status: COMPLETED | OUTPATIENT
Start: 2022-02-18 | End: 2022-02-18

## 2022-02-18 RX ADMIN — SODIUM CHLORIDE 2000 MILLILITER(S): 9 INJECTION, SOLUTION INTRAVENOUS at 13:13

## 2022-02-18 NOTE — ED ADULT NURSE NOTE - NSIMPLEMENTINTERV_GEN_ALL_ED
Implemented All Universal Safety Interventions:  [place] to call system. Call bell, personal items and telephone within reach. Instruct patient to call for assistance. Room bathroom lighting operational. Non-slip footwear when patient is off stretcher. Physically safe environment: no spills, clutter or unnecessary equipment. Stretcher in lowest position, wheels locked, appropriate side rails in place.

## 2022-02-18 NOTE — ED PROVIDER NOTE - CARE PROVIDER_API CALL
Kit Barrow)  EndocrinologyMetabDiabetes  [address], [address]  [place] [address]  Phone: [phone]  Fax: [phone]  Follow Up Time: 4-6 Days

## 2022-02-18 NOTE — ED PROVIDER NOTE - CLINICAL SUMMARY MEDICAL DECISION MAKING FREE TEXT BOX
pt pw elevated blood sugar outside labs. asymptomatic. rule out dka. pt pw elevated blood sugar outside labs. asymptomatic. rule out dka.  labs elevated blood sugar but not dka. ok for dc home

## 2022-02-18 NOTE — ED PROVIDER NOTE - OBJECTIVE STATEMENT
81m afib on eliquis and htn and dm not on meds for it pw elevated blood sugar. pt notes that he went to doctor and had blood tests done 2 days ago. he got a call from pmd damion conner that he needed to come to the er for elevated blood sugar. pt denies any symptoms. no ha, vision loss, rhinorrhea, cp, sob, abd pain, vomiting, fever, chills, excessive thirst, excessive urination, rash, bleeding, numbness, dysuria, testicular pain, anxiety

## 2022-02-18 NOTE — ED ADULT NURSE NOTE - NSICDXPASTSURGICALHX_GEN_ALL_CORE_FT
PAST SURGICAL HISTORY:  H/O: knee surgery left knee replacement    Hx of tonsillectomy as a child

## 2022-02-18 NOTE — ED ADULT TRIAGE NOTE - CHIEF COMPLAINT QUOTE
patient  send here by his Md for abnormal lab  result  , denied chest pain denied difficulty breaching at this time, no other c/o at this time

## 2022-02-18 NOTE — ED ADULT NURSE NOTE - OBJECTIVE STATEMENT
Patient is alert and oriented x4. Sent by PCP for high blood sugar. Did the test 2 days ago at doctor's office. Denies any pain or discomfort at this time. Denies any symptoms. Does not take his FS at home.

## 2022-02-18 NOTE — ED PROVIDER NOTE - NSFOLLOWUPINSTRUCTIONS_ED_ALL_ED_FT
Start taking the new medication METFORMIN twice daily to help control your blood sugar.    Avoid eating sugary foods.    Call the ENDOCRINOLOGIST - diabetes specialist, for a follow up appointment.

## 2022-02-18 NOTE — ED PROVIDER NOTE - PATIENT PORTAL LINK FT
You can access the FollowMyHealth Patient Portal offered by [facility] by registering at the following website: [email or web address]. By joining [facility]’s FollowMyHealth portal, you will also be able to view your health information using other applications (apps) compatible with our system.

## 2022-03-24 NOTE — ED ADULT NURSE NOTE - PAIN RATING/NUMBER SCALE (0-10): ACTIVITY
0 Secondary Intention Text (Leave Blank If You Do Not Want): The defect will heal with secondary intention.

## 2022-11-09 ENCOUNTER — APPOINTMENT (OUTPATIENT)
Dept: UROLOGY | Facility: CLINIC | Age: 82
End: 2022-11-09

## 2022-11-09 VITALS
HEIGHT: 76 IN | SYSTOLIC BLOOD PRESSURE: 130 MMHG | WEIGHT: 247 LBS | TEMPERATURE: 88 F | DIASTOLIC BLOOD PRESSURE: 78 MMHG | BODY MASS INDEX: 30.08 KG/M2

## 2022-11-09 DIAGNOSIS — R97.20 ELEVATED PROSTATE, SPECIFIC ANTIGEN [PSA]: ICD-10-CM

## 2022-11-09 PROCEDURE — 99203 OFFICE O/P NEW LOW 30 MIN: CPT

## 2022-11-09 NOTE — REVIEW OF SYSTEMS
[Negative] : Psychiatric [Feeling Tired] : feeling tired [Shortness Of Breath] : shortness of breath [Cough] : cough [Wheezing] : wheezing [Loss of interest] : loss of interest in sexual activity [Wake up at night to urinate  How many times?  ___] : wakes up to urinate [unfilled] times during the night [Joint Pain] : joint pain [Difficulty Walking] : difficulty walking [Feelings Of Weakness] : feelings of weakness [Easy Bleeding] : a tendency for easy bleeding [Easy Bruising] : a tendency for easy bruising

## 2022-11-10 PROBLEM — R97.20 ELEVATED PSA: Status: ACTIVE | Noted: 2022-11-10

## 2022-11-10 NOTE — ASSESSMENT
[FreeTextEntry1] : noted the risks benefits and controversies of PSA testing.\par also lack of evidence supporting PSA testing after 76 not evidence that therapy if has beneficial.\par plus not high for a 81 yo man.\par plan fn ignoring with no further testing.

## 2022-11-10 NOTE — HISTORY OF PRESENT ILLNESS
[FreeTextEntry1] : referred by PCP for PSA eleavttion.\par noted to be 6.2 with negative UA at time.\par from review of his chart no prior PSA for years. never had a biopsy\par he has some LUTs with urgency but no incontinence and nocturia twice. The FOS good with no other obstructive complaints. No dysuria, hematuria or h/o retention.

## 2023-10-02 ENCOUNTER — APPOINTMENT (OUTPATIENT)
Dept: PULMONOLOGY | Facility: CLINIC | Age: 83
End: 2023-10-02
Payer: MEDICARE

## 2023-10-02 VITALS
SYSTOLIC BLOOD PRESSURE: 115 MMHG | WEIGHT: 230 LBS | BODY MASS INDEX: 28.01 KG/M2 | HEART RATE: 77 BPM | DIASTOLIC BLOOD PRESSURE: 59 MMHG | HEIGHT: 76 IN

## 2023-10-02 DIAGNOSIS — J45.20 MILD INTERMITTENT ASTHMA, UNCOMPLICATED: ICD-10-CM

## 2023-10-02 DIAGNOSIS — I71.20 THORACIC AORTIC ANEURYSM, WITHOUT RUPTURE, UNSPECIFIED: ICD-10-CM

## 2023-10-02 DIAGNOSIS — R06.02 SHORTNESS OF BREATH: ICD-10-CM

## 2023-10-02 PROCEDURE — 94726 PLETHYSMOGRAPHY LUNG VOLUMES: CPT

## 2023-10-02 PROCEDURE — 94729 DIFFUSING CAPACITY: CPT

## 2023-10-02 PROCEDURE — ZZZZZ: CPT

## 2023-10-02 PROCEDURE — 94060 EVALUATION OF WHEEZING: CPT

## 2023-10-02 PROCEDURE — 99205 OFFICE O/P NEW HI 60 MIN: CPT | Mod: 25

## 2023-10-02 RX ORDER — PANTOPRAZOLE 40 MG/1
40 TABLET, DELAYED RELEASE ORAL
Qty: 90 | Refills: 0 | Status: ACTIVE | COMMUNITY
Start: 2023-01-11

## 2023-10-02 RX ORDER — APIXABAN 5 MG/1
5 TABLET, FILM COATED ORAL
Refills: 0 | Status: DISCONTINUED | COMMUNITY
End: 2023-10-02

## 2023-10-02 RX ORDER — GLIMEPIRIDE 1 MG/1
1 TABLET ORAL
Qty: 180 | Refills: 0 | Status: ACTIVE | COMMUNITY
Start: 2023-04-04

## 2023-10-02 RX ORDER — FLUTICASONE FUROATE AND VILANTEROL TRIFENATATE 100; 25 UG/1; UG/1
100-25 POWDER RESPIRATORY (INHALATION)
Qty: 1 | Refills: 6 | Status: ACTIVE | COMMUNITY
Start: 2023-10-02 | End: 1900-01-01

## 2023-10-02 RX ORDER — METFORMIN HYDROCHLORIDE 500 MG/1
500 TABLET, COATED ORAL
Qty: 180 | Refills: 0 | Status: ACTIVE | COMMUNITY
Start: 2023-04-04

## 2023-10-02 RX ORDER — APIXABAN 2.5 MG/1
2.5 TABLET, FILM COATED ORAL
Qty: 90 | Refills: 0 | Status: ACTIVE | COMMUNITY
Start: 2023-08-08

## 2023-10-02 RX ORDER — FLUTICASONE PROPIONATE 50 UG/1
50 SPRAY, METERED NASAL
Qty: 48 | Refills: 0 | Status: ACTIVE | COMMUNITY
Start: 2023-08-29

## 2023-10-30 ENCOUNTER — OUTPATIENT (OUTPATIENT)
Dept: OUTPATIENT SERVICES | Facility: HOSPITAL | Age: 83
LOS: 1 days | End: 2023-10-30
Payer: COMMERCIAL

## 2023-10-30 ENCOUNTER — APPOINTMENT (OUTPATIENT)
Dept: SLEEP CENTER | Facility: CLINIC | Age: 83
End: 2023-10-30
Payer: MEDICARE

## 2023-10-30 DIAGNOSIS — G47.33 OBSTRUCTIVE SLEEP APNEA (ADULT) (PEDIATRIC): ICD-10-CM

## 2023-10-30 PROCEDURE — 95800 SLP STDY UNATTENDED: CPT | Mod: 26

## 2023-10-30 PROCEDURE — 95800 SLP STDY UNATTENDED: CPT

## 2023-11-02 ENCOUNTER — NON-APPOINTMENT (OUTPATIENT)
Age: 83
End: 2023-11-02

## 2023-11-02 DIAGNOSIS — G47.33 OBSTRUCTIVE SLEEP APNEA (ADULT) (PEDIATRIC): ICD-10-CM

## 2023-11-21 ENCOUNTER — APPOINTMENT (OUTPATIENT)
Dept: PULMONOLOGY | Facility: CLINIC | Age: 83
End: 2023-11-21
Payer: MEDICARE

## 2023-11-21 VITALS
WEIGHT: 247 LBS | SYSTOLIC BLOOD PRESSURE: 135 MMHG | DIASTOLIC BLOOD PRESSURE: 80 MMHG | RESPIRATION RATE: 16 BRPM | BODY MASS INDEX: 30.08 KG/M2 | OXYGEN SATURATION: 97 % | TEMPERATURE: 97.9 F | HEIGHT: 76 IN | HEART RATE: 77 BPM

## 2023-11-21 PROCEDURE — 99214 OFFICE O/P EST MOD 30 MIN: CPT

## 2023-12-13 ENCOUNTER — APPOINTMENT (OUTPATIENT)
Dept: PULMONOLOGY | Facility: CLINIC | Age: 83
End: 2023-12-13

## 2024-02-15 ENCOUNTER — APPOINTMENT (OUTPATIENT)
Dept: SLEEP CENTER | Facility: CLINIC | Age: 84
End: 2024-02-15

## 2024-03-21 RX ORDER — ALBUTEROL SULFATE 90 UG/1
108 (90 BASE) INHALANT RESPIRATORY (INHALATION)
Qty: 3 | Refills: 2 | Status: ACTIVE | COMMUNITY
Start: 2023-10-02 | End: 1900-01-01

## 2024-06-25 ENCOUNTER — APPOINTMENT (OUTPATIENT)
Dept: PULMONOLOGY | Facility: CLINIC | Age: 84
End: 2024-06-25

## 2024-07-02 ENCOUNTER — APPOINTMENT (OUTPATIENT)
Dept: UROLOGY | Facility: CLINIC | Age: 84
End: 2024-07-02

## 2024-07-03 ENCOUNTER — APPOINTMENT (OUTPATIENT)
Dept: UROLOGY | Facility: CLINIC | Age: 84
End: 2024-07-03

## 2024-10-18 ENCOUNTER — RX RENEWAL (OUTPATIENT)
Age: 84
End: 2024-10-18

## 2024-10-21 NOTE — ED ADULT NURSE NOTE - NS_SISCREENINGSR_GEN_ALL_ED
Left Shoulder Arthroscopic Superior Labrum Anterior Posterior Repair (L) Operative Note     Date: 10/21/2024  OR Location: [facility] OR    Name: Mac Bello, : 1979, Age: 44 y.o., MRN: [id number], Sex: male    Diagnosis  Pre-op Diagnosis      * Superior labrum anterior-to-posterior (SLAP) tear of left shoulder [S43.432A] Post-op Diagnosis     * Superior labrum anterior-to-posterior (SLAP) tear of left shoulder [S43.432A]     * Tendinopathy of left biceps tendon [M67.922]     Procedures  Left Shoulder Arthroscopic Biceps tenodesis and debridement    MI SURGICAL ARTHROSCOPY SHOULDER BICEPS TENODESIS [11855]  MI SURGICAL ARTHROSCOPY SHOULDER LMTD DBRDMT  [92001]  Surgeons      * Jamie Lucero - Primary    Resident/Fellow/Other Assistant:  Surgeons and Role:  * No surgeons found with a matching role *    Procedure Summary  Anesthesia: Anesthesia type not filed in the log.  ASA: ASA status not filed in the log.  Anesthesia Staff: Anesthesiologist: Desiree Murray MD  CRNA: MYRNA Esparza-CRNA  Estimated Blood Loss: 5 mL  Intra-op Medications:   Administrations occurring from 0730 to 1000 on 10/21/24:   Medication Name Total Dose   EPINEPHrine (Adrenalin) 1 mg in sodium chloride 0.9 % 3,000 mL irrigation 6,000 mL   ceFAZolin (Ancef) vial 1 g 2 g   dexAMETHasone (Decadron) injection 4 mg/mL 8 mg   esmolol (Brevibloc) injection 60 mg   glycopyrrolate PF (Robinul) injection 0.1 mcg   ketamine injection 50 mg/ 5 mL (10 mg/mL) 40 mg   lactated Ringer's infusion Cannot be calculated   lidocaine (Xylocaine) injection 2 % 100 mg   metoprolol tartrate (Lopressor) injection 3 mg   ondansetron (Zofran) 2 mg/mL injection 4 mg   propofol (Diprivan) injection 10 mg/mL 261.07 mg   rocuronium (ZeMuron) 50 mg/5 mL injection 50 mg   sugammadex (Bridion) 200 mg/2 mL injection 200 mg              Anesthesia Record               Intraprocedure I/O Totals       None           Specimen: No specimens collected     Staff:    Circulator: Dennis  Scrub Person: Massiel  Scrub Person: Lashonda         Drains and/or Catheters: * None in log *    Tourniquet Times:         Implants:  Implants       Type Name Action Serial No.      [place] 4.75 KNOTLESS BC LOOP N TACK TENODESIS IMPLANT SYSTEM WITH HD SCORPION NEEDLE Implanted N/A               INDICATIONS FOR PROCEDURE: The patient had significant pain and weakness in the affected extremity which was refractory to nonoperative measures and was diagnosed with SLAP tear and biceps tendinitis of the left shoulder. Treatment options were discussed. The patient was proposed to have a shoulder arthroscopy and related procedures based on preoperative planning and intraoperative findings. Risks and benefits of surgery and alternatives of treatment were discussed.  The patient understood all the risks and benefits and wanted to proceed with surgical option.    DESCRIPTION OF PROCEDURE: Patient was met in the preoperative holding area. Consent for surgery was reviewed and the correct operative extremity was verified and marked. The patient then underwent a preoperative  nerve block, please see anesthesia records regarding this. The patient was then brought to the operating room and was placed in a supine position on the operating table.  SCDs were placed for DVT prophylaxis. General anesthesia was induced. The patient was placed in a beach chair position to around 90 degrees of inclination, and all the bony prominences were well padded. The operative upper extremity was prepped and draped in usual sterile fashion.  A time out was held confirming the correct patient, operative side, operative procedure, preoperative antibiotics, implants being present and that it was safe to proceed--all were in agreement it was safe to proceed.    Exam under anesthesia: Prior to incision, an EUA showed: full passive ROM    Diagnostic shoulder arthroscopy of the glenohumeral joint: A standard posterolateral arthroscopic  portal was made approximately 2 cm medial and inferior to the posterolateral border of the acromion, at the soft spot of the glenohumeral joint. The arthroscope was introduced into the glenohumeral joint. Under arthroscopic visualization, an anterior portal was made in the rotator interval. This was made by first visualizing the trajectory with a spinal needle and then utilizing a cannula from outside to inside to create this anterior portal along a similar path in the same trajectory through a small skin incision.    Diagnostic arthroscopy revealed:   Biceps tendon: Partial tearing with a free flap at the glenoid insertion  Subscapularis: Intact  Anterior capsule: Intact  Glenoid cartilage: Intact  Humeral head cartilage: Mild delamination  Anterior labrum: Small area of degenerative tearing  Superior labrum: Tearing  Posterior labrum: Intact  Axillary recess: No loose bodies  Inferior glenohumeral ligaments: Intact  Posterosuperior rotator cuff: Intact    A debridement was then performed on anterior labrum, humeral head cartilage.  The SLAP region and biceps were inspected.  There was a SLAP tear.  However, the more significant pathology appeared to be the biceps.  There was tenosynovitis as well as an area of split tearing of the biceps proximally.  Due to this, decision was made to proceed with a biceps tenodesis and the debridement of the superior labral tear.  The superior labrum was debrided.  The biceps was secured using a fiber link suture with multiple passes through the biceps.  The biceps was then released from its insertion at the superior labrum.  The biceps was then anchored just proximal in the bicipital groove using a swivel lock anchor.  This achieved excellent fixation.  The area of split thickness tearing of the biceps was debrided.  This achieved biceps tenodesis.    Subacromial bursectomy: Next, the arthroscope was introduced into the subacromial space. There was extensive synovitis and  bursitis in this region with associated scarring. An extensive bursectomy and synovectomy was performed in the subacromial space using a combination of 4.5 mm shaver and the RF device through a lateral portal.     The rotator cuff was carefully inspected.  There was a small area of low-grade partial-thickness tearing in the posterior cuff, but the remainder of the cuff appeared to be very healthy.  A debridement was performed.    Closure of incisions:  After completion of a satisfactory debridement, the shoulder subacromial space was once again swept to remove any remaining loose bone fragments, synovitis, and potential scar tissue. Arthroscopic fluid was evacuated from the subacromial space joint and instruments were removed.    The portals were reapproximated and closed with 3-0 nylon suture. We confirmed that all counts were correct at the end of the case prior to and following closure. A clean sterile dressing consisting of xeroform, fluffs, ABD pad, and foam tape was applied. The drapes were then taken down. The patient was then placed into a sling with an abduction pillow prior to being awoken from anesthesia.    Following the procedure, the patient was satisfactorily awakened from anesthesia and transferred to the postanesthesia care unit in stable condition.     ATTESTATION: Dr. Lucero was present for the entirety of the procedure and personally performed all aspects of this procedure.    Dragon software was used to dictate this note, please be aware that minor errors in transcription may be present.    Jamie Lucero MD    Shoulder/Elbow Surgery  [facility]      Complications:  None; patient tolerated the procedure well.    Disposition: PACU - hemodynamically stable.  Condition: stable       Attending Attestation: I was present and scrubbed for the entire procedure.    Jamie Lucero  Phone Number: [phone]       Negative

## 2024-11-19 ENCOUNTER — NON-APPOINTMENT (OUTPATIENT)
Age: 84
End: 2024-11-19

## 2024-11-20 ENCOUNTER — NON-APPOINTMENT (OUTPATIENT)
Age: 84
End: 2024-11-20

## 2024-12-17 ENCOUNTER — APPOINTMENT (OUTPATIENT)
Dept: UROLOGY | Facility: CLINIC | Age: 84
End: 2024-12-17

## 2025-04-03 NOTE — ED ADULT NURSE NOTE - BEFAST SPEECH SLURRED
Problem: Knowledge Deficit - Standard  Goal: Patient and family/care givers will demonstrate understanding of plan of care, disease process/condition, diagnostic tests and medications  Outcome: Progressing     Problem: Psychosocial  Goal: Spiritual and cultural needs will be incorporated into hospitalization  Outcome: Progressing     Problem: Security Measures  Goal: Patient and family will demonstrate understanding of security measures  Outcome: Progressing     Problem: Nutrition - Standard  Goal: Patient's nutritional and fluid intake will be adequate or improve  Outcome: Progressing     Problem: Fluid Volume  Goal: Fluid volume balance will be maintained  Outcome: Progressing     Problem: Fall Risk  Goal: Patient will remain free from falls  Outcome: Progressing     Problem: Skin Integrity  Goal: Skin integrity is maintained or improved  Outcome: Progressing   The patient is Stable - Low risk of patient condition declining or worsening    Shift Goals  Clinical Goals: Monitor EEG, safety, rest  Patient Goals: play games on IPAD  Family Goals: Update on POC    Progress made toward(s) clinical / shift goals:  On 24 hr EEG monitoring. No seizures noted. Saturating well on RA.     Patient is not progressing towards the following goals:      
The patient is Stable - Low risk of patient condition declining or worsening    Shift Goals  Clinical Goals: Continuous EEG, VSS  Patient Goals: walk around  Family Goals: Update on POC    Progress made toward(s) clinical / shift goals:        Problem: Knowledge Deficit - Standard  Goal: Patient and family/care givers will demonstrate understanding of plan of care, disease process/condition, diagnostic tests and medications  Outcome: Progressing     Problem: Bowel Elimination  Goal: Establish and maintain regular bowel function  Outcome: Progressing       Patient is not progressing towards the following goals:      
The patient is Stable - Low risk of patient condition declining or worsening    Shift Goals  Clinical Goals: Continuous EEG, rest  Patient Goals: Rest  Family Goals: Updates on plan of care    Progress made toward(s) clinical / shift goals:      Problem: Knowledge Deficit - Standard  Goal: Patient and family/care givers will demonstrate understanding of plan of care, disease process/condition, diagnostic tests and medications  Outcome: Progressing   Patient's family updated on plan of care for the night.     Problem: Neuro Status  Goal: Neuro status will remain stable or improve  Outcome: Progressing   Patient's neuro status remaining stable throughout the night. Continuous EEG in progress.    Patient is not progressing towards the following goals:      
No

## 2025-05-05 ENCOUNTER — RX RENEWAL (OUTPATIENT)
Age: 85
End: 2025-05-05